# Patient Record
Sex: FEMALE | Race: WHITE | NOT HISPANIC OR LATINO | Employment: UNEMPLOYED | URBAN - METROPOLITAN AREA
[De-identification: names, ages, dates, MRNs, and addresses within clinical notes are randomized per-mention and may not be internally consistent; named-entity substitution may affect disease eponyms.]

---
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
## 2024-01-08 ENCOUNTER — TRANSCRIBE ORDERS (OUTPATIENT)
Dept: LAB | Facility: HOSPITAL | Age: 19
End: 2024-01-08
Payer: COMMERCIAL

## 2024-01-08 ENCOUNTER — LAB (OUTPATIENT)
Dept: LAB | Facility: HOSPITAL | Age: 19
End: 2024-01-08
Payer: COMMERCIAL

## 2024-01-08 DIAGNOSIS — Z3A.10 10 WEEKS GESTATION OF PREGNANCY: ICD-10-CM

## 2024-01-08 DIAGNOSIS — Z34.81 PRENATAL CARE, SUBSEQUENT PREGNANCY, FIRST TRIMESTER: ICD-10-CM

## 2024-01-08 DIAGNOSIS — Z34.81 PRENATAL CARE, SUBSEQUENT PREGNANCY, FIRST TRIMESTER: Primary | ICD-10-CM

## 2024-01-08 LAB
ABO GROUP BLD: NORMAL
BLD GP AB SCN SERPL QL: NEGATIVE
DEPRECATED RDW RBC AUTO: 42.4 FL (ref 37–54)
ERYTHROCYTE [DISTWIDTH] IN BLOOD BY AUTOMATED COUNT: 13.8 % (ref 12.3–15.4)
HBA1C MFR BLD: 4.8 % (ref 4.8–5.6)
HBV SURFACE AG SERPL QL IA: NORMAL
HCT VFR BLD AUTO: 38.9 % (ref 34–46.6)
HGB BLD-MCNC: 13.2 G/DL (ref 12–15.9)
HIV 1+2 AB+HIV1 P24 AG SERPL QL IA: NORMAL
MCH RBC QN AUTO: 28.7 PG (ref 26.6–33)
MCHC RBC AUTO-ENTMCNC: 33.9 G/DL (ref 31.5–35.7)
MCV RBC AUTO: 84.6 FL (ref 79–97)
PLATELET # BLD AUTO: 226 10*3/MM3 (ref 140–450)
PMV BLD AUTO: 11 FL (ref 6–12)
RBC # BLD AUTO: 4.6 10*6/MM3 (ref 3.77–5.28)
RH BLD: POSITIVE
WBC NRBC COR # BLD AUTO: 8.17 10*3/MM3 (ref 3.4–10.8)

## 2024-01-08 PROCEDURE — 87340 HEPATITIS B SURFACE AG IA: CPT

## 2024-01-08 PROCEDURE — 86901 BLOOD TYPING SEROLOGIC RH(D): CPT

## 2024-01-08 PROCEDURE — 86900 BLOOD TYPING SEROLOGIC ABO: CPT

## 2024-01-08 PROCEDURE — 83036 HEMOGLOBIN GLYCOSYLATED A1C: CPT

## 2024-01-08 PROCEDURE — 86850 RBC ANTIBODY SCREEN: CPT

## 2024-01-08 PROCEDURE — 36415 COLL VENOUS BLD VENIPUNCTURE: CPT

## 2024-01-08 PROCEDURE — G0432 EIA HIV-1/HIV-2 SCREEN: HCPCS

## 2024-01-08 PROCEDURE — 87086 URINE CULTURE/COLONY COUNT: CPT

## 2024-01-08 PROCEDURE — 86762 RUBELLA ANTIBODY: CPT

## 2024-01-08 PROCEDURE — 85027 COMPLETE CBC AUTOMATED: CPT

## 2024-01-09 LAB
BACTERIA SPEC AEROBE CULT: NORMAL
RUBV IGG SERPL IA-ACNC: 1.07 INDEX

## 2024-02-08 ENCOUNTER — LAB (OUTPATIENT)
Dept: LAB | Facility: HOSPITAL | Age: 19
End: 2024-02-08
Payer: COMMERCIAL

## 2024-02-08 ENCOUNTER — TRANSCRIBE ORDERS (OUTPATIENT)
Dept: LAB | Facility: HOSPITAL | Age: 19
End: 2024-02-08
Payer: COMMERCIAL

## 2024-02-08 DIAGNOSIS — Z34.82 PRENATAL CARE, SUBSEQUENT PREGNANCY, SECOND TRIMESTER: ICD-10-CM

## 2024-02-08 DIAGNOSIS — Z3A.14 14 WEEKS GESTATION OF PREGNANCY: Primary | ICD-10-CM

## 2024-02-08 LAB
AMPHET+METHAMPHET UR QL: NEGATIVE
AMPHETAMINES UR QL: NEGATIVE
BARBITURATES UR QL SCN: NEGATIVE
BENZODIAZ UR QL SCN: NEGATIVE
BUPRENORPHINE SERPL-MCNC: NEGATIVE NG/ML
CANNABINOIDS SERPL QL: NEGATIVE
COCAINE UR QL: NEGATIVE
FENTANYL UR-MCNC: POSITIVE NG/ML
HCV AB SER DONR QL: NORMAL
METHADONE UR QL SCN: NEGATIVE
OPIATES UR QL: NEGATIVE
OXYCODONE UR QL SCN: NEGATIVE
PCP UR QL SCN: NEGATIVE
TRICYCLICS UR QL SCN: NEGATIVE

## 2024-02-08 PROCEDURE — 80307 DRUG TEST PRSMV CHEM ANLYZR: CPT

## 2024-02-08 PROCEDURE — 36415 COLL VENOUS BLD VENIPUNCTURE: CPT

## 2024-02-08 PROCEDURE — 87591 N.GONORRHOEAE DNA AMP PROB: CPT

## 2024-02-08 PROCEDURE — 86780 TREPONEMA PALLIDUM: CPT

## 2024-02-08 PROCEDURE — 87491 CHLMYD TRACH DNA AMP PROBE: CPT

## 2024-02-08 PROCEDURE — 86803 HEPATITIS C AB TEST: CPT

## 2024-02-09 ENCOUNTER — TRANSCRIBE ORDERS (OUTPATIENT)
Dept: LAB | Facility: HOSPITAL | Age: 19
End: 2024-02-09
Payer: COMMERCIAL

## 2024-02-09 ENCOUNTER — LAB (OUTPATIENT)
Dept: LAB | Facility: HOSPITAL | Age: 19
End: 2024-02-09
Payer: COMMERCIAL

## 2024-02-09 DIAGNOSIS — Z3A.16 16 WEEKS GESTATION OF PREGNANCY: Primary | ICD-10-CM

## 2024-02-09 DIAGNOSIS — Z34.82 PRENATAL CARE, SUBSEQUENT PREGNANCY, SECOND TRIMESTER: ICD-10-CM

## 2024-02-09 DIAGNOSIS — Z3A.16 16 WEEKS GESTATION OF PREGNANCY: ICD-10-CM

## 2024-02-09 LAB
AMPHET+METHAMPHET UR QL: NEGATIVE
AMPHETAMINES UR QL: NEGATIVE
BARBITURATES UR QL SCN: NEGATIVE
BENZODIAZ UR QL SCN: NEGATIVE
BUPRENORPHINE SERPL-MCNC: NEGATIVE NG/ML
CANNABINOIDS SERPL QL: NEGATIVE
COCAINE UR QL: NEGATIVE
FENTANYL UR-MCNC: NEGATIVE NG/ML
METHADONE UR QL SCN: NEGATIVE
OPIATES UR QL: NEGATIVE
OXYCODONE UR QL SCN: NEGATIVE
PCP UR QL SCN: NEGATIVE
TRICYCLICS UR QL SCN: NEGATIVE

## 2024-02-09 PROCEDURE — 80307 DRUG TEST PRSMV CHEM ANLYZR: CPT

## 2024-02-12 LAB — TREPONEMA PALLIDUM IGG+IGM AB [PRESENCE] IN SERUM OR PLASMA BY IMMUNOASSAY: NON REACTIVE

## 2024-02-14 LAB
C TRACH RRNA SPEC QL NAA+PROBE: NEGATIVE
N GONORRHOEA RRNA SPEC QL NAA+PROBE: NEGATIVE

## 2024-04-18 ENCOUNTER — LAB (OUTPATIENT)
Dept: LAB | Facility: HOSPITAL | Age: 19
End: 2024-04-18
Payer: COMMERCIAL

## 2024-04-18 ENCOUNTER — TRANSCRIBE ORDERS (OUTPATIENT)
Dept: LAB | Facility: HOSPITAL | Age: 19
End: 2024-04-18
Payer: COMMERCIAL

## 2024-04-18 DIAGNOSIS — Z3A.25 25 WEEKS GESTATION OF PREGNANCY: ICD-10-CM

## 2024-04-18 DIAGNOSIS — Z34.82 PRENATAL CARE, SUBSEQUENT PREGNANCY, SECOND TRIMESTER: Primary | ICD-10-CM

## 2024-04-18 DIAGNOSIS — Z34.82 PRENATAL CARE, SUBSEQUENT PREGNANCY, SECOND TRIMESTER: ICD-10-CM

## 2024-04-18 PROCEDURE — 80307 DRUG TEST PRSMV CHEM ANLYZR: CPT

## 2024-05-15 ENCOUNTER — LAB (OUTPATIENT)
Dept: LAB | Facility: HOSPITAL | Age: 19
End: 2024-05-15
Payer: COMMERCIAL

## 2024-05-15 DIAGNOSIS — Z3A.25 25 WEEKS GESTATION OF PREGNANCY: ICD-10-CM

## 2024-05-15 DIAGNOSIS — Z34.82 PRENATAL CARE, SUBSEQUENT PREGNANCY, SECOND TRIMESTER: ICD-10-CM

## 2024-05-15 LAB
BLD GP AB SCN SERPL QL: NEGATIVE
DEPRECATED RDW RBC AUTO: 35.6 FL (ref 37–54)
ERYTHROCYTE [DISTWIDTH] IN BLOOD BY AUTOMATED COUNT: 11.5 % (ref 12.3–15.4)
GLUCOSE 1H P 100 G GLC PO SERPL-MCNC: 79 MG/DL (ref 65–139)
HCT VFR BLD AUTO: 39.9 % (ref 34–46.6)
HGB BLD-MCNC: 13.3 G/DL (ref 12–15.9)
MCH RBC QN AUTO: 28.8 PG (ref 26.6–33)
MCHC RBC AUTO-ENTMCNC: 33.3 G/DL (ref 31.5–35.7)
MCV RBC AUTO: 86.4 FL (ref 79–97)
PLATELET # BLD AUTO: 132 10*3/MM3 (ref 140–450)
PMV BLD AUTO: 12.3 FL (ref 6–12)
RBC # BLD AUTO: 4.62 10*6/MM3 (ref 3.77–5.28)
WBC NRBC COR # BLD AUTO: 7.16 10*3/MM3 (ref 3.4–10.8)

## 2024-05-15 PROCEDURE — 86780 TREPONEMA PALLIDUM: CPT

## 2024-05-15 PROCEDURE — 36415 COLL VENOUS BLD VENIPUNCTURE: CPT

## 2024-05-15 PROCEDURE — 82950 GLUCOSE TEST: CPT

## 2024-05-15 PROCEDURE — 86850 RBC ANTIBODY SCREEN: CPT

## 2024-05-15 PROCEDURE — 85027 COMPLETE CBC AUTOMATED: CPT

## 2024-05-16 LAB — TREPONEMA PALLIDUM IGG+IGM AB [PRESENCE] IN SERUM OR PLASMA BY IMMUNOASSAY: NON REACTIVE

## 2024-06-26 ENCOUNTER — LAB (OUTPATIENT)
Dept: LAB | Facility: HOSPITAL | Age: 19
End: 2024-06-26
Payer: COMMERCIAL

## 2024-06-26 ENCOUNTER — TRANSCRIBE ORDERS (OUTPATIENT)
Dept: LAB | Facility: HOSPITAL | Age: 19
End: 2024-06-26
Payer: COMMERCIAL

## 2024-06-26 DIAGNOSIS — Z34.83 PRENATAL CARE, SUBSEQUENT PREGNANCY, THIRD TRIMESTER: Primary | ICD-10-CM

## 2024-06-26 DIAGNOSIS — Z34.83 PRENATAL CARE, SUBSEQUENT PREGNANCY, THIRD TRIMESTER: ICD-10-CM

## 2024-06-26 LAB
BASOPHILS # BLD AUTO: 0.01 10*3/MM3 (ref 0–0.2)
BASOPHILS NFR BLD AUTO: 0.1 % (ref 0–1.5)
DEPRECATED RDW RBC AUTO: 35.8 FL (ref 37–54)
EOSINOPHIL # BLD AUTO: 0.07 10*3/MM3 (ref 0–0.4)
EOSINOPHIL NFR BLD AUTO: 1 % (ref 0.3–6.2)
ERYTHROCYTE [DISTWIDTH] IN BLOOD BY AUTOMATED COUNT: 11.9 % (ref 12.3–15.4)
HCT VFR BLD AUTO: 39.4 % (ref 34–46.6)
HGB BLD-MCNC: 13 G/DL (ref 12–15.9)
IMM GRANULOCYTES # BLD AUTO: 0.05 10*3/MM3 (ref 0–0.05)
IMM GRANULOCYTES NFR BLD AUTO: 0.7 % (ref 0–0.5)
LYMPHOCYTES # BLD AUTO: 1.61 10*3/MM3 (ref 0.7–3.1)
LYMPHOCYTES NFR BLD AUTO: 22.2 % (ref 19.6–45.3)
MCH RBC QN AUTO: 27.6 PG (ref 26.6–33)
MCHC RBC AUTO-ENTMCNC: 33 G/DL (ref 31.5–35.7)
MCV RBC AUTO: 83.7 FL (ref 79–97)
MONOCYTES # BLD AUTO: 0.61 10*3/MM3 (ref 0.1–0.9)
MONOCYTES NFR BLD AUTO: 8.4 % (ref 5–12)
NEUTROPHILS NFR BLD AUTO: 4.9 10*3/MM3 (ref 1.7–7)
NEUTROPHILS NFR BLD AUTO: 67.6 % (ref 42.7–76)
NRBC BLD AUTO-RTO: 0 /100 WBC (ref 0–0.2)
PLATELET # BLD AUTO: 234 10*3/MM3 (ref 140–450)
PMV BLD AUTO: 11 FL (ref 6–12)
RBC # BLD AUTO: 4.71 10*6/MM3 (ref 3.77–5.28)
WBC NRBC COR # BLD AUTO: 7.25 10*3/MM3 (ref 3.4–10.8)

## 2024-06-26 PROCEDURE — 85025 COMPLETE CBC W/AUTO DIFF WBC: CPT

## 2024-06-26 PROCEDURE — 36415 COLL VENOUS BLD VENIPUNCTURE: CPT

## 2024-07-14 ENCOUNTER — HOSPITAL ENCOUNTER (INPATIENT)
Facility: HOSPITAL | Age: 19
LOS: 4 days | Discharge: HOME | End: 2024-07-18
Attending: OBSTETRICS & GYNECOLOGY | Admitting: OBSTETRICS & GYNECOLOGY
Payer: COMMERCIAL

## 2024-07-14 PROBLEM — Z34.03 ENCOUNTER FOR SUPERVISION OF NORMAL FIRST PREGNANCY IN THIRD TRIMESTER: Status: ACTIVE | Noted: 2024-07-14

## 2024-07-14 PROBLEM — O16.3 ELEVATED BLOOD PRESSURE AFFECTING PREGNANCY IN THIRD TRIMESTER, ANTEPARTUM: Status: ACTIVE | Noted: 2024-07-14

## 2024-07-14 PROBLEM — O13.9 PREGNANCY INDUCED HYPERTENSION: Status: ACTIVE | Noted: 2024-07-14

## 2024-07-14 PROBLEM — O14.90 PREECLAMPSIA: Status: ACTIVE | Noted: 2024-07-14

## 2024-07-14 LAB
ABO GROUP BLD: NORMAL
ALBUMIN SERPL-MCNC: 3.3 G/DL (ref 3.5–5.2)
ALBUMIN/GLOB SERPL: 1.1 G/DL
ALP SERPL-CCNC: 138 U/L (ref 39–117)
ALT SERPL W P-5'-P-CCNC: 6 U/L (ref 1–33)
AMPHET+METHAMPHET UR QL: NEGATIVE
AMPHETAMINES UR QL: NEGATIVE
ANION GAP SERPL CALCULATED.3IONS-SCNC: 11 MMOL/L (ref 5–15)
AST SERPL-CCNC: 13 U/L (ref 1–32)
BARBITURATES UR QL SCN: NEGATIVE
BENZODIAZ UR QL SCN: NEGATIVE
BILIRUB SERPL-MCNC: 0.2 MG/DL (ref 0–1.2)
BLD GP AB SCN SERPL QL: NEGATIVE
BUN SERPL-MCNC: 9 MG/DL (ref 6–20)
BUN/CREAT SERPL: 13.8 (ref 7–25)
BUPRENORPHINE SERPL-MCNC: NEGATIVE NG/ML
CALCIUM SPEC-SCNC: 8.7 MG/DL (ref 8.6–10.5)
CANNABINOIDS SERPL QL: NEGATIVE
CHLORIDE SERPL-SCNC: 105 MMOL/L (ref 98–107)
CO2 SERPL-SCNC: 21 MMOL/L (ref 22–29)
COCAINE UR QL: NEGATIVE
CREAT SERPL-MCNC: 0.65 MG/DL (ref 0.57–1)
DEPRECATED RDW RBC AUTO: 37.8 FL (ref 37–54)
EGFRCR SERPLBLD CKD-EPI 2021: 130.3 ML/MIN/1.73
ERYTHROCYTE [DISTWIDTH] IN BLOOD BY AUTOMATED COUNT: 12.7 % (ref 12.3–15.4)
FENTANYL UR-MCNC: NEGATIVE NG/ML
GLOBULIN UR ELPH-MCNC: 2.9 GM/DL
GLUCOSE SERPL-MCNC: 94 MG/DL (ref 65–99)
HCT VFR BLD AUTO: 37.4 % (ref 34–46.6)
HGB BLD-MCNC: 12.4 G/DL (ref 12–15.9)
MCH RBC QN AUTO: 27.3 PG (ref 26.6–33)
MCHC RBC AUTO-ENTMCNC: 33.2 G/DL (ref 31.5–35.7)
MCV RBC AUTO: 82.2 FL (ref 79–97)
METHADONE UR QL SCN: NEGATIVE
OPIATES UR QL: NEGATIVE
OXYCODONE UR QL SCN: NEGATIVE
PCP UR QL SCN: NEGATIVE
PLATELET # BLD AUTO: 229 10*3/MM3 (ref 140–450)
PMV BLD AUTO: 11.8 FL (ref 6–12)
POTASSIUM SERPL-SCNC: 3.9 MMOL/L (ref 3.5–5.2)
PROT SERPL-MCNC: 6.2 G/DL (ref 6–8.5)
RBC # BLD AUTO: 4.55 10*6/MM3 (ref 3.77–5.28)
RH BLD: POSITIVE
SODIUM SERPL-SCNC: 137 MMOL/L (ref 136–145)
T&S EXPIRATION DATE: NORMAL
TRICYCLICS UR QL SCN: NEGATIVE
WBC NRBC COR # BLD AUTO: 9.55 10*3/MM3 (ref 3.4–10.8)

## 2024-07-14 PROCEDURE — 25810000003 LACTATED RINGERS PER 1000 ML: Performed by: ADVANCED PRACTICE MIDWIFE

## 2024-07-14 PROCEDURE — 80053 COMPREHEN METABOLIC PANEL: CPT | Performed by: OBSTETRICS & GYNECOLOGY

## 2024-07-14 PROCEDURE — 86901 BLOOD TYPING SEROLOGIC RH(D): CPT | Performed by: OBSTETRICS & GYNECOLOGY

## 2024-07-14 PROCEDURE — 85027 COMPLETE CBC AUTOMATED: CPT | Performed by: OBSTETRICS & GYNECOLOGY

## 2024-07-14 PROCEDURE — 99221 1ST HOSP IP/OBS SF/LOW 40: CPT | Performed by: OBSTETRICS & GYNECOLOGY

## 2024-07-14 PROCEDURE — 59025 FETAL NON-STRESS TEST: CPT

## 2024-07-14 PROCEDURE — 80307 DRUG TEST PRSMV CHEM ANLYZR: CPT | Performed by: OBSTETRICS & GYNECOLOGY

## 2024-07-14 PROCEDURE — 86850 RBC ANTIBODY SCREEN: CPT | Performed by: OBSTETRICS & GYNECOLOGY

## 2024-07-14 PROCEDURE — 86900 BLOOD TYPING SEROLOGIC ABO: CPT | Performed by: OBSTETRICS & GYNECOLOGY

## 2024-07-14 PROCEDURE — 3E033VJ INTRODUCTION OF OTHER HORMONE INTO PERIPHERAL VEIN, PERCUTANEOUS APPROACH: ICD-10-PCS | Performed by: ADVANCED PRACTICE MIDWIFE

## 2024-07-14 PROCEDURE — 59025 FETAL NON-STRESS TEST: CPT | Performed by: OBSTETRICS & GYNECOLOGY

## 2024-07-14 PROCEDURE — 93005 ELECTROCARDIOGRAM TRACING: CPT | Performed by: ADVANCED PRACTICE MIDWIFE

## 2024-07-14 PROCEDURE — 86780 TREPONEMA PALLIDUM: CPT | Performed by: OBSTETRICS & GYNECOLOGY

## 2024-07-14 PROCEDURE — 93010 ELECTROCARDIOGRAM REPORT: CPT | Performed by: INTERNAL MEDICINE

## 2024-07-14 RX ORDER — ACETAMINOPHEN 325 MG/1
650 TABLET ORAL EVERY 4 HOURS PRN
Status: DISCONTINUED | OUTPATIENT
Start: 2024-07-14 | End: 2024-07-15 | Stop reason: SDUPTHER

## 2024-07-14 RX ORDER — SODIUM CHLORIDE 9 MG/ML
40 INJECTION, SOLUTION INTRAVENOUS AS NEEDED
Status: DISCONTINUED | OUTPATIENT
Start: 2024-07-14 | End: 2024-07-16 | Stop reason: HOSPADM

## 2024-07-14 RX ORDER — SODIUM CHLORIDE 0.9 % (FLUSH) 0.9 %
10 SYRINGE (ML) INJECTION AS NEEDED
Status: DISCONTINUED | OUTPATIENT
Start: 2024-07-14 | End: 2024-07-16 | Stop reason: HOSPADM

## 2024-07-14 RX ORDER — ONDANSETRON 2 MG/ML
4 INJECTION INTRAMUSCULAR; INTRAVENOUS EVERY 6 HOURS PRN
Status: DISCONTINUED | OUTPATIENT
Start: 2024-07-14 | End: 2024-07-16 | Stop reason: SDUPTHER

## 2024-07-14 RX ORDER — SODIUM CHLORIDE, SODIUM LACTATE, POTASSIUM CHLORIDE, CALCIUM CHLORIDE 600; 310; 30; 20 MG/100ML; MG/100ML; MG/100ML; MG/100ML
125 INJECTION, SOLUTION INTRAVENOUS CONTINUOUS
Status: DISCONTINUED | OUTPATIENT
Start: 2024-07-14 | End: 2024-07-16

## 2024-07-14 RX ORDER — MORPHINE SULFATE 2 MG/ML
2 INJECTION, SOLUTION INTRAMUSCULAR; INTRAVENOUS
Status: DISCONTINUED | OUTPATIENT
Start: 2024-07-14 | End: 2024-07-16 | Stop reason: HOSPADM

## 2024-07-14 RX ORDER — MAGNESIUM CARB/ALUMINUM HYDROX 105-160MG
30 TABLET,CHEWABLE ORAL ONCE
Status: DISCONTINUED | OUTPATIENT
Start: 2024-07-14 | End: 2024-07-16 | Stop reason: HOSPADM

## 2024-07-14 RX ORDER — NALOXONE HCL 0.4 MG/ML
0.4 VIAL (ML) INJECTION
Status: DISCONTINUED | OUTPATIENT
Start: 2024-07-14 | End: 2024-07-16 | Stop reason: HOSPADM

## 2024-07-14 RX ORDER — SODIUM CHLORIDE 0.9 % (FLUSH) 0.9 %
10 SYRINGE (ML) INJECTION EVERY 12 HOURS SCHEDULED
Status: DISCONTINUED | OUTPATIENT
Start: 2024-07-14 | End: 2024-07-16 | Stop reason: HOSPADM

## 2024-07-14 RX ORDER — CALCIUM CARBONATE 500 MG/1
1 TABLET, CHEWABLE ORAL DAILY
COMMUNITY

## 2024-07-14 RX ORDER — LIDOCAINE HYDROCHLORIDE 10 MG/ML
0.5 INJECTION, SOLUTION EPIDURAL; INFILTRATION; INTRACAUDAL; PERINEURAL ONCE AS NEEDED
Status: DISCONTINUED | OUTPATIENT
Start: 2024-07-14 | End: 2024-07-16 | Stop reason: HOSPADM

## 2024-07-14 RX ORDER — TERBUTALINE SULFATE 1 MG/ML
0.25 INJECTION, SOLUTION SUBCUTANEOUS AS NEEDED
Status: DISCONTINUED | OUTPATIENT
Start: 2024-07-14 | End: 2024-07-16 | Stop reason: HOSPADM

## 2024-07-14 RX ORDER — ONDANSETRON 4 MG/1
4 TABLET, ORALLY DISINTEGRATING ORAL EVERY 6 HOURS PRN
Status: DISCONTINUED | OUTPATIENT
Start: 2024-07-14 | End: 2024-07-16 | Stop reason: SDUPTHER

## 2024-07-14 RX ORDER — MORPHINE SULFATE 2 MG/ML
2 INJECTION, SOLUTION INTRAMUSCULAR; INTRAVENOUS EVERY 4 HOURS PRN
Status: DISCONTINUED | OUTPATIENT
Start: 2024-07-14 | End: 2024-07-16 | Stop reason: HOSPADM

## 2024-07-14 RX ADMIN — SODIUM CHLORIDE, POTASSIUM CHLORIDE, SODIUM LACTATE AND CALCIUM CHLORIDE 125 ML/HR: 600; 310; 30; 20 INJECTION, SOLUTION INTRAVENOUS at 22:01

## 2024-07-14 NOTE — H&P
"[facility]  Ct Doe  : 2005  MRN: [id number]  CSN: [id number]    History and Physical    Subjective   Chief Complaint   Patient presents with    Decreased Fetal Movement     Ct Doe is a 18 y.o. year old  with an Estimated Date of Delivery: 8/3/24 currently at 37w1d presenting with decreased fetal movement for less than 24 hours.  Denies leaking of fluid or bleeding.  Denies contractions.    [facility]atal care [facility].  It has been benign.    OB History    Para Term  AB Living   1 0 0 0 0 0   SAB IAB Ectopic Molar Multiple Live Births   0 0 0 0 0 0      # Outcome Date GA Lbr Ganga/2nd Weight Sex Type Anes PTL Lv   1 Current                No past medical history on file.    No past surgical history on file.    Medications Prior to Admission   Medication Sig Dispense Refill Last Dose    calcium carbonate (TUMS) 500 MG chewable tablet Chew 1 tablet Daily.   Past Week       No Known Allergies  Social History    Tobacco Use      Smoking status: Never      Smokeless tobacco: Not on file    Review of Systems   Constitutional:  Negative for unexpected weight change.   Respiratory:  Negative for cough and shortness of breath.    Cardiovascular:  Negative for chest pain.   Gastrointestinal:  Negative for abdominal distention, constipation and diarrhea.        No persistent bloating   Genitourinary:  Negative for difficulty urinating, dysuria, hematuria and urgency.        No significant incontinence   Skin:  Negative for rash.   Neurological:  Negative for headaches.         Objective   /95   Pulse 86   Resp 16   Ht 162.6 cm (64\")   SpO2 96%   General: well developed; well nourished  no acute distress   Abdomen: soft, non-tender; no masses  no umbilical or inguinal hernias are present  no hepato-splenomegaly   FHT's: reassuring and category 1      Cervix: was not checked.   Presentation: cephalic   Contractions: none     Prenatal Labs  Lab " Results   Component Value Date    HGB 13.0 2024    RUBELLAABIGG 1.07 2024    HEPBSAG Non-Reactive 2024    TREPONEMAP Non Reactive 05/15/2024    ABSCRN Negative 05/15/2024    FHI7LXY3 Non-Reactive 2024    HEPCVIRUSABY Non-Reactive 2024    GCT 79 05/15/2024    URINECX <10,000 CFU/mL Normal Urogenital Victorina 2024    CHLAMNAA Negative 2024    NGONORRHON Negative 2024        Assessment   IUP at 37w1d  Decreased fetal movement with reactive, category 1 fetal heart rate tracing  Elevated systolic and diastolic blood pressure x 1     Plan   Observe for the next hour to check blood pressure  If blood pressures come back down to normal okay for discharge to home with short interval follow-up to recheck blood pressure in clinic this week  If blood pressures remain elevated, discussed with her primary OB on-call but anticipate given gestational age will admit for further evaluation with anticipated induction of labor    Saurav Cordero MD  2024  18:32 EDT          Non Stress Test    [facility]    Patient: Ct Doe  : 2005  MRN: [id number]  CSN: [id number]  Date: 2024    Estimated Date of Delivery: 8/3/24  Gestational Age: 37w1d    Indication for NST decreased fetal movement       Total Time on NST > 20 minutes       Interpretation    Baseline  beats per minute   Category 1   Decelerations Absent       Additional Comments none       This note has been electronically signed.    Saurav Cordero MD  2024  18:32 EDT

## 2024-07-14 NOTE — PROGRESS NOTES
Patient Vitals for the past 24 hrs:   BP Temp Pulse SpO2   07/14/24 1910 120/78 -- -- --   07/14/24 1900 126/98 -- -- --   07/14/24 1850 124/90 -- -- --   07/14/24 1840 126/89 -- -- --   07/14/24 1831 128/94 -- -- --   07/14/24 1828 -- 98.1 °F (36.7 °C) 86 97 %   07/14/24 1827 -- -- 86 96 %   07/14/24 1826 -- -- 80 97 %   07/14/24 1824 140/95 -- -- --     Will bring to labor and delivery and observe blood pressures  Allow to eat at this point and check NSTs  Does not need continuous monitoring  Currently does not meet criteria for gestational hypertensive disease may develop that with serial blood pressures  Check CBC and CMP  May be candidate for discharge if blood pressures continue to trend down  Have reviewed with RAJI Farias M.D.  July 14, 2024  19:21 EDT

## 2024-07-15 ENCOUNTER — ANESTHESIA EVENT (OUTPATIENT)
Dept: OBSTETRICS AND GYNECOLOGY | Facility: HOSPITAL | Age: 19
End: 2024-07-15
Payer: COMMERCIAL

## 2024-07-15 ENCOUNTER — APPOINTMENT (OUTPATIENT)
Dept: CARDIOLOGY | Facility: HOSPITAL | Age: 19
End: 2024-07-15
Payer: COMMERCIAL

## 2024-07-15 ENCOUNTER — ANESTHESIA (OUTPATIENT)
Dept: LABOR AND DELIVERY | Facility: HOSPITAL | Age: 19
End: 2024-07-15
Payer: COMMERCIAL

## 2024-07-15 ENCOUNTER — ANESTHESIA EVENT (OUTPATIENT)
Dept: LABOR AND DELIVERY | Facility: HOSPITAL | Age: 19
End: 2024-07-15
Payer: COMMERCIAL

## 2024-07-15 ENCOUNTER — ANESTHESIA (OUTPATIENT)
Dept: OBSTETRICS AND GYNECOLOGY | Facility: HOSPITAL | Age: 19
End: 2024-07-15
Payer: COMMERCIAL

## 2024-07-15 ENCOUNTER — APPOINTMENT (OUTPATIENT)
Dept: GENERAL RADIOLOGY | Facility: HOSPITAL | Age: 19
End: 2024-07-15
Payer: COMMERCIAL

## 2024-07-15 PROBLEM — Z34.03 ENCOUNTER FOR SUPERVISION OF NORMAL FIRST PREGNANCY IN THIRD TRIMESTER: Status: RESOLVED | Noted: 2024-07-14 | Resolved: 2024-07-15

## 2024-07-15 LAB
BH CV ECHO MEAS - AO MAX PG: 8.3 MMHG
BH CV ECHO MEAS - AO MEAN PG: 4.5 MMHG
BH CV ECHO MEAS - AO ROOT DIAM: 3.4 CM
BH CV ECHO MEAS - AO V2 MAX: 144 CM/SEC
BH CV ECHO MEAS - AO V2 VTI: 29.9 CM
BH CV ECHO MEAS - AVA(I,D): 2.19 CM2
BH CV ECHO MEAS - EDV(CUBED): 64 ML
BH CV ECHO MEAS - EDV(MOD-SP2): 87.4 ML
BH CV ECHO MEAS - EDV(MOD-SP4): 117 ML
BH CV ECHO MEAS - EF(MOD-BP): 58.8 %
BH CV ECHO MEAS - EF(MOD-SP2): 60.3 %
BH CV ECHO MEAS - EF(MOD-SP4): 59.9 %
BH CV ECHO MEAS - ESV(CUBED): 15.6 ML
BH CV ECHO MEAS - ESV(MOD-SP2): 34.7 ML
BH CV ECHO MEAS - ESV(MOD-SP4): 46.9 ML
BH CV ECHO MEAS - FS: 37.5 %
BH CV ECHO MEAS - IVS/LVPW: 1.11 CM
BH CV ECHO MEAS - IVSD: 1 CM
BH CV ECHO MEAS - LA DIMENSION: 2.9 CM
BH CV ECHO MEAS - LAT PEAK E' VEL: 13.9 CM/SEC
BH CV ECHO MEAS - LV MASS(C)D: 118.2 GRAMS
BH CV ECHO MEAS - LV MAX PG: 4.4 MMHG
BH CV ECHO MEAS - LV MEAN PG: 2.5 MMHG
BH CV ECHO MEAS - LV V1 MAX: 104.5 CM/SEC
BH CV ECHO MEAS - LV V1 VTI: 20.8 CM
BH CV ECHO MEAS - LVIDD: 4 CM
BH CV ECHO MEAS - LVIDS: 2.5 CM
BH CV ECHO MEAS - LVOT AREA: 3.1 CM2
BH CV ECHO MEAS - LVOT DIAM: 2 CM
BH CV ECHO MEAS - LVPWD: 0.9 CM
BH CV ECHO MEAS - MED PEAK E' VEL: 6.6 CM/SEC
BH CV ECHO MEAS - MV A MAX VEL: 55.5 CM/SEC
BH CV ECHO MEAS - MV DEC SLOPE: 362 CM/SEC2
BH CV ECHO MEAS - MV DEC TIME: 0.21 SEC
BH CV ECHO MEAS - MV E MAX VEL: 69.7 CM/SEC
BH CV ECHO MEAS - MV E/A: 1.26
BH CV ECHO MEAS - MV MAX PG: 2.6 MMHG
BH CV ECHO MEAS - MV MEAN PG: 1 MMHG
BH CV ECHO MEAS - MV P1/2T: 64.6 MSEC
BH CV ECHO MEAS - MV V2 VTI: 24.3 CM
BH CV ECHO MEAS - MVA(P1/2T): 3.4 CM2
BH CV ECHO MEAS - MVA(VTI): 2.7 CM2
BH CV ECHO MEAS - PA ACC TIME: 0.16 SEC
BH CV ECHO MEAS - PA V2 MAX: 112 CM/SEC
BH CV ECHO MEAS - RAP SYSTOLE: 3 MMHG
BH CV ECHO MEAS - RVSP: 18 MMHG
BH CV ECHO MEAS - SV(LVOT): 65.3 ML
BH CV ECHO MEAS - SV(MOD-SP2): 52.7 ML
BH CV ECHO MEAS - SV(MOD-SP4): 70.1 ML
BH CV ECHO MEAS - TAPSE (>1.6): 1.6 CM
BH CV ECHO MEAS - TR MAX PG: 15 MMHG
BH CV ECHO MEAS - TR MAX VEL: 190.5 CM/SEC
BH CV ECHO MEASUREMENTS AVERAGE E/E' RATIO: 6.8
BH CV XLRA - RV BASE: 3.5 CM
BH CV XLRA - RV LENGTH: 9.4 CM
BH CV XLRA - RV MID: 3.2 CM
BH CV XLRA - TDI S': 12.5 CM/SEC
EXTERNAL GROUP B STREP ANTIGEN: NORMAL
LEFT ATRIUM VOLUME INDEX: 17.3 ML/M2
TREPONEMA PALLIDUM IGG+IGM AB [PRESENCE] IN SERUM OR PLASMA BY IMMUNOASSAY: NORMAL

## 2024-07-15 PROCEDURE — 25010000002 MORPHINE PER 10 MG: Performed by: ADVANCED PRACTICE MIDWIFE

## 2024-07-15 PROCEDURE — 25010000002 PROPOFOL 10 MG/ML EMULSION: Performed by: ANESTHESIOLOGY

## 2024-07-15 PROCEDURE — 25010000002 METOCLOPRAMIDE PER 10 MG: Performed by: ANESTHESIOLOGY

## 2024-07-15 PROCEDURE — 25810000003 LACTATED RINGERS PER 1000 ML: Performed by: ANESTHESIOLOGY

## 2024-07-15 PROCEDURE — 93306 TTE W/DOPPLER COMPLETE: CPT | Performed by: INTERNAL MEDICINE

## 2024-07-15 PROCEDURE — 25810000003 LACTATED RINGERS PER 1000 ML: Performed by: ADVANCED PRACTICE MIDWIFE

## 2024-07-15 PROCEDURE — 71045 X-RAY EXAM CHEST 1 VIEW: CPT

## 2024-07-15 PROCEDURE — 99222 1ST HOSP IP/OBS MODERATE 55: CPT | Performed by: INTERNAL MEDICINE

## 2024-07-15 PROCEDURE — 93306 TTE W/DOPPLER COMPLETE: CPT

## 2024-07-15 PROCEDURE — 10907ZC DRAINAGE OF AMNIOTIC FLUID, THERAPEUTIC FROM PRODUCTS OF CONCEPTION, VIA NATURAL OR ARTIFICIAL OPENING: ICD-10-PCS

## 2024-07-15 PROCEDURE — 25010000002 CEFAZOLIN PER 500 MG: Performed by: ADVANCED PRACTICE MIDWIFE

## 2024-07-15 PROCEDURE — 25010000002 OXYTOCIN PER 10 UNITS: Performed by: ANESTHESIOLOGY

## 2024-07-15 PROCEDURE — 25010000002 HYDROMORPHONE PER 4 MG: Performed by: ANESTHESIOLOGY

## 2024-07-15 RX ORDER — OXYTOCIN/0.9 % SODIUM CHLORIDE 30/500 ML
PLASTIC BAG, INJECTION (ML) INTRAVENOUS
Status: DISCONTINUED
Start: 2024-07-15 | End: 2024-07-18 | Stop reason: HOSPADM

## 2024-07-15 RX ORDER — FAMOTIDINE 10 MG/ML
INJECTION, SOLUTION INTRAVENOUS AS NEEDED
Status: DISCONTINUED | OUTPATIENT
Start: 2024-07-15 | End: 2024-07-16 | Stop reason: SURG

## 2024-07-15 RX ORDER — EPHEDRINE SULFATE 5 MG/ML
INJECTION INTRAVENOUS
Status: DISCONTINUED
Start: 2024-07-15 | End: 2024-07-15 | Stop reason: WASHOUT

## 2024-07-15 RX ORDER — SODIUM CHLORIDE, SODIUM LACTATE, POTASSIUM CHLORIDE, CALCIUM CHLORIDE 600; 310; 30; 20 MG/100ML; MG/100ML; MG/100ML; MG/100ML
INJECTION, SOLUTION INTRAVENOUS CONTINUOUS PRN
Status: DISCONTINUED | OUTPATIENT
Start: 2024-07-15 | End: 2024-07-16 | Stop reason: SURG

## 2024-07-15 RX ORDER — PROPOFOL 10 MG/ML
VIAL (ML) INTRAVENOUS AS NEEDED
Status: DISCONTINUED | OUTPATIENT
Start: 2024-07-15 | End: 2024-07-16 | Stop reason: SURG

## 2024-07-15 RX ORDER — OXYTOCIN/0.9 % SODIUM CHLORIDE 30/500 ML
PLASTIC BAG, INJECTION (ML) INTRAVENOUS AS NEEDED
Status: DISCONTINUED | OUTPATIENT
Start: 2024-07-15 | End: 2024-07-16 | Stop reason: SURG

## 2024-07-15 RX ORDER — ACETAMINOPHEN 500 MG
1000 TABLET ORAL ONCE
Status: COMPLETED | OUTPATIENT
Start: 2024-07-15 | End: 2024-07-15

## 2024-07-15 RX ORDER — CITRIC ACID/SODIUM CITRATE 334-500MG
30 SOLUTION, ORAL ORAL ONCE
Status: COMPLETED | OUTPATIENT
Start: 2024-07-15 | End: 2024-07-15

## 2024-07-15 RX ORDER — SUCCINYLCHOLINE/SOD CL,ISO/PF 200MG/10ML
SYRINGE (ML) INTRAVENOUS AS NEEDED
Status: DISCONTINUED | OUTPATIENT
Start: 2024-07-15 | End: 2024-07-16 | Stop reason: SURG

## 2024-07-15 RX ORDER — OXYTOCIN 10 [USP'U]/ML
INJECTION, SOLUTION INTRAMUSCULAR; INTRAVENOUS AS NEEDED
Status: DISCONTINUED | OUTPATIENT
Start: 2024-07-15 | End: 2024-07-16 | Stop reason: SURG

## 2024-07-15 RX ORDER — HYDROMORPHONE HYDROCHLORIDE 2 MG/ML
INJECTION, SOLUTION INTRAMUSCULAR; INTRAVENOUS; SUBCUTANEOUS AS NEEDED
Status: DISCONTINUED | OUTPATIENT
Start: 2024-07-15 | End: 2024-07-16 | Stop reason: SURG

## 2024-07-15 RX ORDER — CITRIC ACID/SODIUM CITRATE 334-500MG
SOLUTION, ORAL ORAL
Status: COMPLETED
Start: 2024-07-15 | End: 2024-07-15

## 2024-07-15 RX ORDER — METOCLOPRAMIDE HYDROCHLORIDE 5 MG/ML
INJECTION INTRAMUSCULAR; INTRAVENOUS AS NEEDED
Status: DISCONTINUED | OUTPATIENT
Start: 2024-07-15 | End: 2024-07-16 | Stop reason: SURG

## 2024-07-15 RX ORDER — OXYTOCIN/0.9 % SODIUM CHLORIDE 30/500 ML
2-20 PLASTIC BAG, INJECTION (ML) INTRAVENOUS
Status: DISCONTINUED | OUTPATIENT
Start: 2024-07-15 | End: 2024-07-15

## 2024-07-15 RX ADMIN — SODIUM CHLORIDE, POTASSIUM CHLORIDE, SODIUM LACTATE AND CALCIUM CHLORIDE 125 ML/HR: 600; 310; 30; 20 INJECTION, SOLUTION INTRAVENOUS at 19:36

## 2024-07-15 RX ADMIN — METOCLOPRAMIDE 10 MG: 5 INJECTION, SOLUTION INTRAMUSCULAR; INTRAVENOUS at 22:59

## 2024-07-15 RX ADMIN — SODIUM CHLORIDE, POTASSIUM CHLORIDE, SODIUM LACTATE AND CALCIUM CHLORIDE 125 ML/HR: 600; 310; 30; 20 INJECTION, SOLUTION INTRAVENOUS at 04:13

## 2024-07-15 RX ADMIN — MORPHINE SULFATE 2 MG: 2 INJECTION, SOLUTION INTRAMUSCULAR; INTRAVENOUS at 00:54

## 2024-07-15 RX ADMIN — FAMOTIDINE 20 MG: 10 INJECTION, SOLUTION INTRAVENOUS at 22:59

## 2024-07-15 RX ADMIN — SODIUM CHLORIDE, POTASSIUM CHLORIDE, SODIUM LACTATE AND CALCIUM CHLORIDE 125 ML/HR: 600; 310; 30; 20 INJECTION, SOLUTION INTRAVENOUS at 12:07

## 2024-07-15 RX ADMIN — OXYTOCIN 10 UNITS: 10 INJECTION, SOLUTION INTRAMUSCULAR; INTRAVENOUS at 23:23

## 2024-07-15 RX ADMIN — SODIUM CHLORIDE 2 G: 900 INJECTION INTRAVENOUS at 22:46

## 2024-07-15 RX ADMIN — SODIUM CHLORIDE, POTASSIUM CHLORIDE, SODIUM LACTATE AND CALCIUM CHLORIDE 125 ML/HR: 600; 310; 30; 20 INJECTION, SOLUTION INTRAVENOUS at 21:27

## 2024-07-15 RX ADMIN — Medication 30 ML: at 22:45

## 2024-07-15 RX ADMIN — Medication 2 MILLI-UNITS/MIN: at 12:03

## 2024-07-15 RX ADMIN — HYDROMORPHONE HYDROCHLORIDE 0.5 MG: 2 INJECTION, SOLUTION INTRAMUSCULAR; INTRAVENOUS; SUBCUTANEOUS at 23:42

## 2024-07-15 RX ADMIN — ACETAMINOPHEN 1000 MG: 500 TABLET ORAL at 22:46

## 2024-07-15 RX ADMIN — Medication 500 ML: at 23:25

## 2024-07-15 RX ADMIN — SODIUM CHLORIDE, POTASSIUM CHLORIDE, SODIUM LACTATE AND CALCIUM CHLORIDE: 600; 310; 30; 20 INJECTION, SOLUTION INTRAVENOUS at 22:58

## 2024-07-15 RX ADMIN — PROPOFOL 150 MG: 10 INJECTION, EMULSION INTRAVENOUS at 23:18

## 2024-07-15 RX ADMIN — SODIUM CITRATE AND CITRIC ACID MONOHYDRATE 30 ML: 500; 334 SOLUTION ORAL at 22:45

## 2024-07-15 RX ADMIN — Medication 120 MG: at 23:18

## 2024-07-15 RX ADMIN — HYDROMORPHONE HYDROCHLORIDE 1 MG: 2 INJECTION, SOLUTION INTRAMUSCULAR; INTRAVENOUS; SUBCUTANEOUS at 23:24

## 2024-07-15 RX ADMIN — HYDROMORPHONE HYDROCHLORIDE 0.5 MG: 2 INJECTION, SOLUTION INTRAMUSCULAR; INTRAVENOUS; SUBCUTANEOUS at 23:57

## 2024-07-15 RX ADMIN — MORPHINE SULFATE 2 MG: 2 INJECTION, SOLUTION INTRAMUSCULAR; INTRAVENOUS at 14:45

## 2024-07-15 NOTE — PROGRESS NOTES
"07/14/24  23:04 EDT  Ct Doe      ASSESSMENTS:  PB recheck was 141/90 so will proceed with IOL.  Patient advised and is agreement with plan of care.  Billings bulb inserted without difficulty.      /90   Pulse 89   Temp 98.1 °F (36.7 °C) (Oral)   Resp 18   Ht 162.6 cm (64\")   SpO2 97%   Breastfeeding No     Fetal Heart Rate Assessment   Method: Fetal HR Assessment Method: external   Beats/min: Fetal HR (beats/min): 135   Baseline: Fetal HR Baseline: normal range   Varibility: Fetal HR Variability: moderate (amplitude range 6 to 25 bpm)   Accels: Fetal HR Accelerations: greater than/equal to 15 bpm, lasting at least 15 seconds   Decels: Fetal HR Decelerations: absent   Tracing Category:       Uterine Assessment   Method: Method: external tocotransducer   Frequency (min):     Ctx Count in 10 min:     Duration:     Intensity: Contraction Intensity: no contractions   Intensity by IUPC:     Resting Tone: Uterine Resting Tone: soft by palpation   Resting Tone by IUPC:     Stanley Units:                                Presentation: vertex   Cervix: Exam by: Method: sterile exam per CN   Dilation: Cervical Dilation (cm): 1   Effacement:  70   Station:  -2            Lab Results   Component Value Date    WBC 9.55 07/14/2024    HGB 12.4 07/14/2024    HCT 37.4 07/14/2024    MCV 82.2 07/14/2024     07/14/2024    CREATININE 0.65 07/14/2024    AST 13 07/14/2024    ALT 6 07/14/2024     Results from last 7 days   Lab Units 07/14/24 1934   ABO TYPING  O   RH TYPING  Positive   ANTIBODY SCREEN  Negative       PLAN: Keep for IOL due to pregnancy induced hypertension. Billings balloon inserted, will do low dose pitocin and increase in am    Martina Etsrella CNM  23:04 EDT  07/14/24  "

## 2024-07-15 NOTE — CONSULTS
[facility] Cardiology  Consultation History and Physical  Ct Doe  2005      PCP:   Rima Bass MD (Inactive)        Date of  Consultation: 7/15/2024 10:08 EDT     Reason for Consultation: Cardiac risk evaluation prior to labor induction    History of Present Illness:  Ct Doe  Is a 18 y.o. female with medical history including an admission at [facility] for an overdose in July 2023.  During that stay she did undergo a left heart catheterization for concern for STEMI.  Reviewing the cardiology notes, her coronary arteries showed no significant stenoses.  There was vasospasm noted in the brachial artery during the catheterization and suspected vasospasm of the posterior descending artery.  Patient's left ventricular end-diastolic pressure was elevated at that time at 26.  She states after discharge she did not have any recurrent issues and she did not have any cardiology follow-up.  She did have an echocardiogram done in 2021 prior to possible scoliosis surgery.  Echocardiogram at that time showed a normal ejection fraction.  Normal RV size and function.  Trace tricuspid valve regurgitation.  No other acute issues.  An echocardiogram done earlier this morning again showed a normal ejection fraction of 56 to 60%.  There was mild mitral valve prolapse with only trace mitral valve regurgitation.  Patient states she has not had any issues during this pregnancy.  She initially presented for decreased fetal movement.  She did require nasal cannula oxygen overnight.  She denies chest pain swelling or excess edema.  EKG done last night was normal.      Patient Active Problem List    Diagnosis Date Noted    *Elevated blood pressure affecting pregnancy in third trimester, antepartum 07/14/2024    Encounter for supervision of normal first pregnancy in third trimester 07/14/2024    Pregnancy induced hypertension 07/14/2024       No Known Allergies    Social History  "    Socioeconomic History    Marital status: Single   Tobacco Use    Smoking status: Never     Passive exposure: Never    Smokeless tobacco: Never   Vaping Use    Vaping status: Never Used   Substance and Sexual Activity    Alcohol use: Not Currently    Drug use: Not Currently     Types: Fentanyl, Benzodiazepines     Comment: pt admitted to overdose \"incident\" that resulted in cardiac cath in 2023, pt first stated it was from \"half a xanax I think\" then later stated \"she thinks it was a little bit of fentanyl\"       History reviewed. No pertinent family history.    Current Medications:    Current Facility-Administered Medications:     acetaminophen (TYLENOL) tablet 650 mg, 650 mg, Oral, Q4H PRN, Martina Estrella, CNM    lactated ringers bolus 1,000 mL, 1,000 mL, Intravenous, Once, Martina Estrella, CNM    lactated ringers infusion, 125 mL/hr, Intravenous, Continuous, Martina Estrella, CHRISTOPHERM, Last Rate: 125 mL/hr at 07/15/24 0413, 125 mL/hr at 07/15/24 0413    lidocaine PF 1% (XYLOCAINE) injection 0.5 mL, 0.5 mL, Intradermal, Once PRN, Martina Estrella, CNTRACE    mineral oil liquid 30 mL, 30 mL, Topical, Once, Martina Estrella, RAJI    morphine injection 2 mg, 2 mg, Intravenous, Q4H PRN **AND** naloxone (NARCAN) injection 0.4 mg, 0.4 mg, Intravenous, Q5 Min PRN, Martina Estrella, CHRISTOPHERM    morphine injection 2 mg, 2 mg, Intravenous, Q2H PRN, 2 mg at 07/15/24 0054 **AND** naloxone (NARCAN) injection 0.4 mg, 0.4 mg, Intravenous, Q5 Min PRN, Martina Estrella, CNM    ondansetron ODT (ZOFRAN-ODT) disintegrating tablet 4 mg, 4 mg, Oral, Q6H PRN **OR** ondansetron (ZOFRAN) injection 4 mg, 4 mg, Intravenous, Q6H PRN, Martina Estrella, CHRISTOPHERM    sodium chloride 0.9 % flush 10 mL, 10 mL, Intravenous, Q12H, Martina Estrella, RAJI    sodium chloride 0.9 % flush 10 mL, 10 mL, Intravenous, PRN, Martina Estrella, RAJI    sodium chloride 0.9 % infusion 40 mL, 40 mL, Intravenous, PRN, Martina Estrella, RAJI    terbutaline (BRETHINE) injection 0.25 " mg, 0.25 mg, Subcutaneous, PRN, Martina Estrella CNM     Review of Systems   Cardiovascular: Negative.    Respiratory: Negative.         OBJECTIVE:  Vitals:    07/15/24 1000 07/15/24 1002 07/15/24 1004 07/15/24 1005   BP:       BP Location:       Patient Position:       Pulse: 79 76 78 75   Resp:       Temp:       TempSrc:       SpO2: 97% 99% 97% 97%   Weight:       Height:         No intake/output data recorded.  No intake/output data recorded.  Intake & Output (last 3 days)       None               Physical Exam  Constitutional:       Appearance: Normal appearance.   Cardiovascular:      Rate and Rhythm: Normal rate and regular rhythm.      Pulses: Normal pulses.      Heart sounds: Normal heart sounds. No murmur heard.  Pulmonary:      Effort: Pulmonary effort is normal.      Breath sounds: Normal breath sounds. No rales.   Musculoskeletal:      Right lower leg: No edema.      Left lower leg: No edema.   Neurological:      General: No focal deficit present.      Mental Status: She is alert.         Diagnostic Data:  Lab Results (last 24 hours)       Procedure Component Value Units Date/Time    Group B Streptococcus Culture - Swab, Vaginal/Rectum [[id number]] Resulted: 07/15/24    Specimen: Swab from Vaginal/Rectum Updated: 07/15/24 0037     External Strep Group B Ag NEG     Comment: per Sameer ALEGRIA       Fentanyl, Urine - Urine, Clean Catch [[id number]]  (Normal) Collected: 07/14/24 2200    Specimen: Urine, Clean Catch Updated: 07/14/24 2315     Fentanyl, Urine Negative    Narrative:      Negative Threshold:      Fentanyl 5 ng/mL     The normal value for the drug tested is negative. This report includes final unconfirmed screening results to be used for medical treatment purposes only. Unconfirmed results must not be used for non-medical purposes such as employment or legal testing. Clinical consideration should be applied to any drug of abuse test, particularly when unconfirmed results are used.           Urine  Drug Screen - Urine, Clean Catch [[id number]]  (Normal) Collected: 07/14/24 2200    Specimen: Urine, Clean Catch Updated: 07/14/24 2307     THC, Screen, Urine Negative     Phencyclidine (PCP), Urine Negative     Cocaine Screen, Urine Negative     Methamphetamine, Ur Negative     Opiate Screen Negative     Amphetamine Screen, Urine Negative     Benzodiazepine Screen, Urine Negative     Tricyclic Antidepressants Screen Negative     Methadone Screen, Urine Negative     Barbiturates Screen, Urine Negative     Oxycodone Screen, Urine Negative     Buprenorphine, Screen, Urine Negative    Narrative:      Cutoff For Drugs Screened:    Amphetamines               500 ng/ml  Barbiturates               200 ng/ml  Benzodiazepines            150 ng/ml  Cocaine                    150 ng/ml  Methadone                  200 ng/ml  Opiates                    100 ng/ml  Phencyclidine               25 ng/ml  THC                         50 ng/ml  Methamphetamine            500 ng/ml  Tricyclic Antidepressants  300 ng/ml  Oxycodone                  100 ng/ml  Buprenorphine               10 ng/ml    The normal value for all drugs tested is negative. This report includes unconfirmed screening results, with the cutoff values listed, to be used for medical treatment purposes only.  Unconfirmed results must not be used for non-medical purposes such as employment or legal testing.  Clinical consideration should be applied to any drug of abuse test, particularly when unconfirmed results are used.      Comprehensive Metabolic Panel [[id number]]  (Abnormal) Collected: 07/14/24 1934    Specimen: Blood Updated: 07/14/24 1959     Glucose 94 mg/dL      BUN 9 mg/dL      Creatinine 0.65 mg/dL      Sodium 137 mmol/L      Potassium 3.9 mmol/L      Chloride 105 mmol/L      CO2 21.0 mmol/L      Calcium 8.7 mg/dL      Total Protein 6.2 g/dL      Albumin 3.3 g/dL      ALT (SGPT) 6 U/L      AST (SGOT) 13 U/L      Alkaline Phosphatase 138 U/L      Total Bilirubin  0.2 mg/dL      Globulin 2.9 gm/dL      Comment: Calculated Result        A/G Ratio 1.1 g/dL      BUN/Creatinine Ratio 13.8     Anion Gap 11.0 mmol/L      eGFR 130.3 mL/min/1.73     Narrative:      GFR Normal >60  Chronic Kidney Disease <60  Kidney Failure <15      CBC (No Diff) [[id number]]  (Normal) Collected: 07/14/24 1934    Specimen: Blood Updated: 07/14/24 1942     WBC 9.55 10*3/mm3      RBC 4.55 10*6/mm3      Hemoglobin 12.4 g/dL      Hematocrit 37.4 %      MCV 82.2 fL      MCH 27.3 pg      MCHC 33.2 g/dL      RDW 12.7 %      RDW-SD 37.8 fl      MPV 11.8 fL      Platelets 229 10*3/mm3     Treponema pallidum AB w/Reflex RPR [[id number]] Collected: 07/14/24 1934    Specimen: Blood Updated: 07/14/24 1939          ECG/EMG Results (last 24 hours)       Procedure Component Value Units Date/Time    ECG 12 Lead Other; Hx of STEMI [[id number]] Collected: 07/14/24 2332     Updated: 07/15/24 0721     QT Interval 396 ms      QTC Interval 430 ms     Narrative:      Test Reason : Other~  Blood Pressure :   */*   mmHG  Vent. Rate :  71 BPM     Atrial Rate :  71 BPM     P-R Int : 156 ms          QRS Dur :  96 ms      QT Int : 396 ms       P-R-T Axes :  16  54  24 degrees     QTc Int : 430 ms    Normal sinus rhythm  Normal ECG  No previous ECGs available    Referred By: ANGELI           Confirmed By:     Adult Transthoracic Echo Complete W/ Cont if Necessary Per Protocol [[id number]] Resulted: 07/15/24 0954     Updated: 07/15/24 0958     EF(MOD-bp) 58.8 %      LVIDd 4.0 cm      LVIDs 2.5 cm      IVSd 1.00 cm      LVPWd 0.90 cm      FS 37.5 %      IVS/LVPW 1.11 cm      ESV(cubed) 15.6 ml      EDV(cubed) 64.0 ml      LV mass(C)d 118.2 grams      LVOT area 3.1 cm2      LVOT diam 2.00 cm      EDV(MOD-sp2) 87.4 ml      EDV(MOD-sp4) 117.0 ml      ESV(MOD-sp2) 34.7 ml      ESV(MOD-sp4) 46.9 ml      SV(MOD-sp2) 52.7 ml      SV(MOD-sp4) 70.1 ml      EF(MOD-sp2) 60.3 %      EF(MOD-sp4) 59.9 %      MV E max drea 69.7 cm/sec      MV A max drea  55.5 cm/sec      MV dec time 0.21 sec      MV E/A 1.26     LA ESV Index (BP) 17.3 ml/m2      Med Peak E' Gee 6.6 cm/sec      Lat Peak E' Gee 13.9 cm/sec      TR max gee 190.5 cm/sec      Avg E/e' ratio 6.80     SV(LVOT) 65.3 ml      RV Base 3.5 cm      RV Mid 3.2 cm      RV Length 9.4 cm      TAPSE (>1.6) 1.60 cm      RV S' 12.5 cm/sec      LA dimension (2D)  2.9 cm      LV V1 max 104.5 cm/sec      LV V1 max PG 4.4 mmHg      LV V1 mean PG 2.5 mmHg      LV V1 VTI 20.8 cm      Ao pk gee 144.0 cm/sec      Ao max PG 8.3 mmHg      Ao mean PG 4.5 mmHg      Ao V2 VTI 29.9 cm      SALVADOR(I,D) 2.19 cm2      MV max PG 2.6 mmHg      MV mean PG 1.00 mmHg      MV V2 VTI 24.3 cm      MV P1/2t 64.6 msec      MVA(P1/2t) 3.4 cm2      MVA(VTI) 2.7 cm2      MV dec slope 362.0 cm/sec2      TR max PG 15 mmHg      PA V2 max 112.0 cm/sec      PA acc time 0.16 sec      Ao root diam 3.4 cm      RVSP(TR) 18 mmHg      RAP systole 3 mmHg     Narrative:        Left ventricular ejection fraction appears to be 56 - 60%.    There is mild mitral valve prolapse of the anterior mitral leaflet.   Trace mitral valve regurgitation is present.                Elevated blood pressure affecting pregnancy in third trimester, antepartum    Encounter for supervision of normal first pregnancy in third trimester    Pregnancy induced hypertension      Assessment/Plan:      Cardiac risk evaluation prior to labor induction  Cardiac catheterization reviewed from July 2023.  No obstructive coronary artery disease at that time.  Echocardiogram today showing a normal ejection fraction with mild mitral valve prolapse and trace mitral valve regurgitation.  EKG normal.  Patient's heart rate and blood pressure are currently within the normal range.  I believe she can proceed with labor induction at low cardiac risk.  No special precautions needed at this time.  Please call with questions or concerns.  We will be available.          Mirza Yoon MD formerly [facility]

## 2024-07-15 NOTE — PROGRESS NOTES
"07/15/24  06:21 EDT  Ct Doe      ASSESSMENTS:  Patient still has galdamez balloon in.  No contractions noted.  Patient had sats drop to 80's.  Pulse ox was placed on due to suspicious cardiac history.  Currently on nasal canual with oxygen at 5 l/min to keep sats in 90's.  MFM consult ordered for this am.    /73 (BP Location: Left arm, Patient Position: Lying)   Pulse 68   Temp 98.5 °F (36.9 °C) (Oral)   Resp 16   Ht 162.6 cm (64\")   Wt 100 kg (221 lb)   SpO2 94%   Breastfeeding No   BMI 37.93 kg/m²     Fetal Heart Rate Assessment   Method: Fetal HR Assessment Method: (P) external   Beats/min: Fetal HR (beats/min): (P) 115   Baseline: Fetal HR Baseline: (P) normal range   Varibility: Fetal HR Variability: (P) moderate (amplitude range 6 to 25 bpm)   Accels: Fetal HR Accelerations: (P) greater than/equal to 15 bpm, lasting at least 15 seconds   Decels: Fetal HR Decelerations: (P) absent   Tracing Category:       Uterine Assessment   Method: Method: (P) external tocotransducer   Frequency (min): Contraction Frequency (Minutes): toco malfunction   Ctx Count in 10 min:     Duration:     Intensity: Contraction Intensity: (P) no contractions   Intensity by IUPC:     Resting Tone: Uterine Resting Tone: soft by palpation   Resting Tone by IUPC:     [place] Units:                                Presentation: vertex   Cervix: Exam by: Method: sterile exam per CNM   Dilation: Cervical Dilation (cm): 1 on galdamez placement but has not been checked since   Effacement:     Station:              Lab Results   Component Value Date    WBC 9.55 07/14/2024    HGB 12.4 07/14/2024    HCT 37.4 07/14/2024    MCV 82.2 07/14/2024     07/14/2024    CREATININE 0.65 07/14/2024    AST 13 07/14/2024    ALT 6 07/14/2024     Results from last 7 days   Lab Units 07/14/24 1934   ABO TYPING  O   RH TYPING  Positive   ANTIBODY SCREEN  Negative       PLAN: Await maternal echo, cardiology consult and MFM consult prior " to proceeding with induction.  Dr. Starks aware.    Martina Estrella, RAJI  06:21 EDT  07/15/24

## 2024-07-15 NOTE — NURSING NOTE
Nursing House Supervisor notified per phone of patient's admission and status (including hx of heart cath, EKG done, cardio consult in am, echocardiogram in am).

## 2024-07-15 NOTE — PROGRESS NOTES
"07/15/24  16:49 EDT  Ct Doe      ASSESSMENTS: Ct is sitting on birthing ball breathing through contractions. She plans no epidural for labor and birth.     /70 (BP Location: Left arm, Patient Position: Lying)   Pulse 64   Temp 97.8 °F (36.6 °C) (Oral)   Resp 17   Ht 162.6 cm (64.02\")   Wt 100 kg (220 lb 7.4 oz)   SpO2 98%   Breastfeeding No   BMI 37.82 kg/m²     Fetal Heart Rate Assessment   Method: Fetal HR Assessment Method: external   Beats/min: Fetal HR (beats/min): 120   Baseline: Fetal HR Baseline: normal range   Varibility: Fetal HR Variability: moderate (amplitude range 6 to 25 bpm)   Accels: Fetal HR Accelerations: greater than/equal to 15 bpm, lasting at least 15 seconds   Decels: Fetal HR Decelerations: absent   Tracing Category:  1     Uterine Assessment   Method: Method: palpation   Frequency (min): Contraction Frequency (Minutes): 4   Ctx Count in 10 min:     Duration:     Intensity: Contraction Intensity: moderate   Intensity by IUPC:     Resting Tone: Uterine Resting Tone: soft by palpation   Resting Tone by IUPC:     [place] Units:                                Presentation: vertex   Cervix: Exam by: Method: sterile exam per RN   Dilation: Cervical Dilation (cm): 5   Effacement: Cervical Effacement: 70-80%   Station: Fetal Station: -2            Lab Results   Component Value Date    WBC 9.55 07/14/2024    HGB 12.4 07/14/2024    HCT 37.4 07/14/2024    MCV 82.2 07/14/2024     07/14/2024    CREATININE 0.65 07/14/2024    AST 13 07/14/2024    ALT 6 07/14/2024     Results from last 7 days   Lab Units 07/14/24  1934   ABO TYPING  O   RH TYPING  Positive   ANTIBODY SCREEN  Negative       ASSESSMENT:  IUP at 37w2d  Decreased fetal movement with reactive, category 1 fetal heart rate tracing  Elevated systolic and diastolic blood pressure x 2    PLAN: Continue PPP. Recheck cervix as indicated by maternal-fetal status.     Delta Parikh CNM  16:49 EDT  07/15/24  "

## 2024-07-15 NOTE — PROGRESS NOTES
"Update note:  RN was reviewing her records in care everywhere and she had a cardiac cath in 7/2023.  Stated indication was \"STEMI\".  Records indicated that she needed f/u for non ischemic cardiomyopathy.  She was in ICU during this time.  Patient was asked about the circumstances surrounding her ICU admission at .  Her mother was on the phone and stated they told her to f/u with cardiology but she \"never had any more problems so she never felt it was needed\".  Patient stated she had taken 1/2 xanax off the street and there \"must have been something else mixed with it\".  When asking further she said she didn't know what she took because her mother found her unconscious in her room and called 911.  She did admit that it was probably \"fentanyl\".  She did have a positive drug screen for Fentanyl in 2/2024 at the beginning of her pregnancy  but she stated she had not taken any so came the next day for a repeat UDS which was negative.  She states she has not done any drugs since.    Dr. Starks was advised of this.  Her 12 lead EKG was normal and we will order a ECHO of her heart in the am.  Cardiology will be consulted prior to proceeding with induction other than the balloon.  Last BP was in normal range.  She states she feels some mild cramping.  Advised of the plan of care.    FHR currently category 1    Martina Estrella CNM    "

## 2024-07-15 NOTE — PROGRESS NOTES
"07/15/24  12:51 EDT  Ct Doe      ASSESSMENTS: Doing well. Normal chest xray. Normal O2 sat after changing pulse oximeter on RA. Received approval to proceed with IOL from  Cardiology. PDC in agreement. Pitocin started.     /69   Pulse 76   Temp 98 °F (36.7 °C) (Oral)   Resp 16   Ht 162.6 cm (64.02\")   Wt 100 kg (220 lb 7.4 oz)   SpO2 98%   Breastfeeding No   BMI 37.82 kg/m²     Fetal Heart Rate Assessment   Method: Fetal HR Assessment Method: external   Beats/min: Fetal HR (beats/min): 135   Baseline: Fetal HR Baseline: normal range   Varibility: Fetal HR Variability: moderate (amplitude range 6 to 25 bpm)   Accels: Fetal HR Accelerations: greater than/equal to 15 bpm, lasting at least 15 seconds   Decels: Fetal HR Decelerations: absent   Tracing Category:       Uterine Assessment   Method: Method: external tocotransducer   Frequency (min): Contraction Frequency (Minutes): 5   Ctx Count in 10 min:     Duration:     Intensity: Contraction Intensity: no contractions   Intensity by IUPC:     Resting Tone: Uterine Resting Tone: soft by palpation   Resting Tone by IUPC:     [place] Units:                                Presentation: Vertex   Cervix: Exam by: Method: sterile exam per CNM   Dilation: Cervical Dilation (cm): 4-5   Effacement: Cervical Effacement: 60%   Station: Fetal Station: -2  Offered AROM, she desires, clear fluid            Lab Results   Component Value Date    WBC 9.55 07/14/2024    HGB 12.4 07/14/2024    HCT 37.4 07/14/2024    MCV 82.2 07/14/2024     07/14/2024    CREATININE 0.65 07/14/2024    AST 13 07/14/2024    ALT 6 07/14/2024     Results from last 7 days   Lab Units 07/14/24 1934   ABO TYPING  O   RH TYPING  Positive   ANTIBODY SCREEN  Negative       PLAN: Cont PPP. Epidural vs IV pain meds if desires.     Bernie Higgins CNM  12:51 EDT  07/15/24  "

## 2024-07-15 NOTE — PROGRESS NOTES
"07/15/24  08:38 EDT  Ct Doe      ASSESSMENTS: Reports doing well overall with no concerns. Remains on O2. Current sats 97%. Awaiting echo and consult from [facility] for continues plan of care. FB out.     /63   Pulse 76   Temp 98 °F (36.7 °C) (Oral)   Resp 16   Ht 162.6 cm (64\")   Wt 100 kg (221 lb)   SpO2 95%   Breastfeeding No   BMI 37.93 kg/m²     Fetal Heart Rate Assessment   Method: Fetal HR Assessment Method: external   Beats/min: Fetal HR (beats/min): 130   Baseline: Fetal HR Baseline: normal range   Varibility: Fetal HR Variability: moderate (amplitude range 6 to 25 bpm)   Accels: Fetal HR Accelerations: greater than/equal to 15 bpm, lasting at least 15 seconds   Decels: Fetal HR Decelerations: absent   Tracing Category:       Uterine Assessment   Method: Method: external tocotransducer   Frequency (min): Contraction Frequency (Minutes): toco malfunction   Ctx Count in 10 min:     Duration:     Intensity: Contraction Intensity: no contractions   Intensity by IUPC:     Resting Tone: Uterine Resting Tone: soft by palpation   Resting Tone by IUPC:     [place] Units:                                Presentation: vertex   Cervix: Deferred at this time while awaiting POC, most recent Exam by: Method: sterile exam per [facility]   Dilation: Cervical Dilation (cm): 1   Effacement:     Station:              Lab Results   Component Value Date    WBC 9.55 07/14/2024    HGB 12.4 07/14/2024    HCT 37.4 07/14/2024    MCV 82.2 07/14/2024     07/14/2024    CREATININE 0.65 07/14/2024    AST 13 07/14/2024    ALT 6 07/14/2024     Results from last 7 days   Lab Units 07/14/24  1934   ABO TYPING  O   RH TYPING  Positive   ANTIBODY SCREEN  Negative       PLAN:Await maternal echo, cardiology consult and [facility] consult prior to proceeding with induction. Dr. Marvin christianson.     Bernie Higgins CNM  08:38 EDT  07/15/24  "

## 2024-07-16 PROBLEM — O13.9 PREGNANCY INDUCED HYPERTENSION: Status: RESOLVED | Noted: 2024-07-14 | Resolved: 2024-07-16

## 2024-07-16 PROBLEM — Z98.891 S/P PRIMARY LOW TRANSVERSE C-SECTION: Status: ACTIVE | Noted: 2024-07-16

## 2024-07-16 PROCEDURE — 25010000002 HYDROMORPHONE PER 4 MG: Performed by: ANESTHESIOLOGY

## 2024-07-16 PROCEDURE — 25010000002 KETOROLAC TROMETHAMINE PER 15 MG: Performed by: OBSTETRICS & GYNECOLOGY

## 2024-07-16 PROCEDURE — 25810000003 LACTATED RINGERS PER 1000 ML: Performed by: ADVANCED PRACTICE MIDWIFE

## 2024-07-16 RX ORDER — IBUPROFEN 600 MG/1
600 TABLET ORAL EVERY 6 HOURS
Status: DISCONTINUED | OUTPATIENT
Start: 2024-07-17 | End: 2024-07-18 | Stop reason: HOSPADM

## 2024-07-16 RX ORDER — IBUPROFEN 600 MG/1
600 TABLET ORAL EVERY 6 HOURS PRN
Status: DISCONTINUED | OUTPATIENT
Start: 2024-07-16 | End: 2024-07-16 | Stop reason: HOSPADM

## 2024-07-16 RX ORDER — KETOROLAC TROMETHAMINE 15 MG/ML
15 INJECTION, SOLUTION INTRAMUSCULAR; INTRAVENOUS EVERY 6 HOURS
Status: COMPLETED | OUTPATIENT
Start: 2024-07-16 | End: 2024-07-17

## 2024-07-16 RX ORDER — OXYCODONE AND ACETAMINOPHEN 10; 325 MG/1; MG/1
2 TABLET ORAL EVERY 4 HOURS PRN
Status: DISCONTINUED | OUTPATIENT
Start: 2024-07-16 | End: 2024-07-16 | Stop reason: HOSPADM

## 2024-07-16 RX ORDER — CARBOPROST TROMETHAMINE 250 UG/ML
250 INJECTION, SOLUTION INTRAMUSCULAR AS NEEDED
Status: DISCONTINUED | OUTPATIENT
Start: 2024-07-16 | End: 2024-07-16 | Stop reason: HOSPADM

## 2024-07-16 RX ORDER — OXYTOCIN/0.9 % SODIUM CHLORIDE 30/500 ML
999 PLASTIC BAG, INJECTION (ML) INTRAVENOUS ONCE
Status: COMPLETED | OUTPATIENT
Start: 2024-07-16 | End: 2024-07-16

## 2024-07-16 RX ORDER — OXYCODONE HYDROCHLORIDE AND ACETAMINOPHEN 5; 325 MG/1; MG/1
1 TABLET ORAL EVERY 4 HOURS PRN
Status: DISCONTINUED | OUTPATIENT
Start: 2024-07-16 | End: 2024-07-16 | Stop reason: HOSPADM

## 2024-07-16 RX ORDER — MISOPROSTOL 200 UG/1
600 TABLET ORAL AS NEEDED
Status: DISCONTINUED | OUTPATIENT
Start: 2024-07-16 | End: 2024-07-18 | Stop reason: HOSPADM

## 2024-07-16 RX ORDER — ONDANSETRON 4 MG/1
4 TABLET, ORALLY DISINTEGRATING ORAL EVERY 6 HOURS PRN
Status: DISCONTINUED | OUTPATIENT
Start: 2024-07-16 | End: 2024-07-18 | Stop reason: HOSPADM

## 2024-07-16 RX ORDER — HYDROCORTISONE 25 MG/G
1 CREAM TOPICAL AS NEEDED
Status: DISCONTINUED | OUTPATIENT
Start: 2024-07-16 | End: 2024-07-18 | Stop reason: HOSPADM

## 2024-07-16 RX ORDER — KETOROLAC TROMETHAMINE 30 MG/ML
30 INJECTION, SOLUTION INTRAMUSCULAR; INTRAVENOUS ONCE
Status: DISCONTINUED | OUTPATIENT
Start: 2024-07-16 | End: 2024-07-16

## 2024-07-16 RX ORDER — OXYTOCIN/0.9 % SODIUM CHLORIDE 30/500 ML
250 PLASTIC BAG, INJECTION (ML) INTRAVENOUS CONTINUOUS
Status: ACTIVE | OUTPATIENT
Start: 2024-07-16 | End: 2024-07-16

## 2024-07-16 RX ORDER — MISOPROSTOL 200 UG/1
800 TABLET ORAL AS NEEDED
Status: DISCONTINUED | OUTPATIENT
Start: 2024-07-16 | End: 2024-07-16 | Stop reason: HOSPADM

## 2024-07-16 RX ORDER — PRENATAL VIT/IRON FUM/FOLIC AC 27MG-0.8MG
1 TABLET ORAL DAILY
Status: DISCONTINUED | OUTPATIENT
Start: 2024-07-16 | End: 2024-07-18 | Stop reason: HOSPADM

## 2024-07-16 RX ORDER — METHYLERGONOVINE MALEATE 0.2 MG/ML
200 INJECTION INTRAVENOUS ONCE AS NEEDED
Status: DISCONTINUED | OUTPATIENT
Start: 2024-07-16 | End: 2024-07-16 | Stop reason: HOSPADM

## 2024-07-16 RX ORDER — OXYTOCIN/0.9 % SODIUM CHLORIDE 30/500 ML
125 PLASTIC BAG, INJECTION (ML) INTRAVENOUS ONCE AS NEEDED
Status: DISCONTINUED | OUTPATIENT
Start: 2024-07-16 | End: 2024-07-18 | Stop reason: HOSPADM

## 2024-07-16 RX ORDER — ALUMINA, MAGNESIA, AND SIMETHICONE 2400; 2400; 240 MG/30ML; MG/30ML; MG/30ML
15 SUSPENSION ORAL EVERY 4 HOURS PRN
Status: DISCONTINUED | OUTPATIENT
Start: 2024-07-16 | End: 2024-07-18 | Stop reason: HOSPADM

## 2024-07-16 RX ORDER — DIPHENHYDRAMINE HCL 25 MG
25 CAPSULE ORAL EVERY 4 HOURS PRN
Status: DISCONTINUED | OUTPATIENT
Start: 2024-07-16 | End: 2024-07-18 | Stop reason: HOSPADM

## 2024-07-16 RX ORDER — HYDROMORPHONE HYDROCHLORIDE 1 MG/ML
0.5 INJECTION, SOLUTION INTRAMUSCULAR; INTRAVENOUS; SUBCUTANEOUS
Status: DISCONTINUED | OUTPATIENT
Start: 2024-07-16 | End: 2024-07-16 | Stop reason: HOSPADM

## 2024-07-16 RX ORDER — ACETAMINOPHEN 325 MG/1
650 TABLET ORAL EVERY 6 HOURS
Status: DISCONTINUED | OUTPATIENT
Start: 2024-07-17 | End: 2024-07-18 | Stop reason: HOSPADM

## 2024-07-16 RX ORDER — POLYETHYLENE GLYCOL 3350 17 G/17G
17 POWDER, FOR SOLUTION ORAL DAILY
Status: DISCONTINUED | OUTPATIENT
Start: 2024-07-16 | End: 2024-07-18 | Stop reason: HOSPADM

## 2024-07-16 RX ORDER — ONDANSETRON 2 MG/ML
4 INJECTION INTRAMUSCULAR; INTRAVENOUS EVERY 6 HOURS PRN
Status: DISCONTINUED | OUTPATIENT
Start: 2024-07-16 | End: 2024-07-16 | Stop reason: HOSPADM

## 2024-07-16 RX ORDER — ONDANSETRON 4 MG/1
4 TABLET, ORALLY DISINTEGRATING ORAL EVERY 6 HOURS PRN
Status: DISCONTINUED | OUTPATIENT
Start: 2024-07-16 | End: 2024-07-16 | Stop reason: HOSPADM

## 2024-07-16 RX ORDER — ACETAMINOPHEN 325 MG/1
650 TABLET ORAL EVERY 4 HOURS PRN
Status: DISCONTINUED | OUTPATIENT
Start: 2024-07-16 | End: 2024-07-16 | Stop reason: HOSPADM

## 2024-07-16 RX ORDER — SIMETHICONE 80 MG
80 TABLET,CHEWABLE ORAL 4 TIMES DAILY PRN
Status: DISCONTINUED | OUTPATIENT
Start: 2024-07-16 | End: 2024-07-18 | Stop reason: HOSPADM

## 2024-07-16 RX ORDER — CALCIUM CARBONATE 500 MG/1
1 TABLET, CHEWABLE ORAL EVERY 4 HOURS PRN
Status: DISCONTINUED | OUTPATIENT
Start: 2024-07-16 | End: 2024-07-18 | Stop reason: HOSPADM

## 2024-07-16 RX ORDER — ACETAMINOPHEN 500 MG
1000 TABLET ORAL EVERY 6 HOURS
Status: COMPLETED | OUTPATIENT
Start: 2024-07-16 | End: 2024-07-16

## 2024-07-16 RX ORDER — OXYCODONE HYDROCHLORIDE 10 MG/1
10 TABLET ORAL EVERY 4 HOURS PRN
Status: DISCONTINUED | OUTPATIENT
Start: 2024-07-16 | End: 2024-07-18 | Stop reason: HOSPADM

## 2024-07-16 RX ORDER — METHYLERGONOVINE MALEATE 0.2 MG/ML
200 INJECTION INTRAVENOUS AS NEEDED
Status: DISCONTINUED | OUTPATIENT
Start: 2024-07-16 | End: 2024-07-18 | Stop reason: HOSPADM

## 2024-07-16 RX ORDER — ONDANSETRON 2 MG/ML
4 INJECTION INTRAMUSCULAR; INTRAVENOUS EVERY 6 HOURS PRN
Status: DISCONTINUED | OUTPATIENT
Start: 2024-07-16 | End: 2024-07-18 | Stop reason: HOSPADM

## 2024-07-16 RX ORDER — CARBOPROST TROMETHAMINE 250 UG/ML
250 INJECTION, SOLUTION INTRAMUSCULAR AS NEEDED
Status: DISCONTINUED | OUTPATIENT
Start: 2024-07-16 | End: 2024-07-18 | Stop reason: HOSPADM

## 2024-07-16 RX ORDER — DOCUSATE SODIUM 100 MG/1
100 CAPSULE, LIQUID FILLED ORAL 2 TIMES DAILY PRN
Status: DISCONTINUED | OUTPATIENT
Start: 2024-07-16 | End: 2024-07-18 | Stop reason: HOSPADM

## 2024-07-16 RX ORDER — KETOROLAC TROMETHAMINE 30 MG/ML
30 INJECTION, SOLUTION INTRAMUSCULAR; INTRAVENOUS ONCE
Status: COMPLETED | OUTPATIENT
Start: 2024-07-16 | End: 2024-07-16

## 2024-07-16 RX ORDER — OXYCODONE HYDROCHLORIDE 5 MG/1
5 TABLET ORAL EVERY 4 HOURS PRN
Status: DISCONTINUED | OUTPATIENT
Start: 2024-07-16 | End: 2024-07-18 | Stop reason: HOSPADM

## 2024-07-16 RX ADMIN — PRENATAL VITAMINS-IRON FUMARATE 27 MG IRON-FOLIC ACID 0.8 MG TABLET 1 TABLET: at 08:22

## 2024-07-16 RX ADMIN — KETOROLAC TROMETHAMINE 15 MG: 15 INJECTION, SOLUTION INTRAMUSCULAR; INTRAVENOUS at 19:54

## 2024-07-16 RX ADMIN — ACETAMINOPHEN 1000 MG: 500 TABLET ORAL at 10:40

## 2024-07-16 RX ADMIN — KETOROLAC TROMETHAMINE 15 MG: 15 INJECTION, SOLUTION INTRAMUSCULAR; INTRAVENOUS at 08:22

## 2024-07-16 RX ADMIN — ACETAMINOPHEN 1000 MG: 500 TABLET ORAL at 05:29

## 2024-07-16 RX ADMIN — HYDROMORPHONE HYDROCHLORIDE 0.5 MG: 1 INJECTION, SOLUTION INTRAMUSCULAR; INTRAVENOUS; SUBCUTANEOUS at 01:59

## 2024-07-16 RX ADMIN — Medication 999 ML/HR: at 00:37

## 2024-07-16 RX ADMIN — ACETAMINOPHEN 1000 MG: 500 TABLET ORAL at 23:00

## 2024-07-16 RX ADMIN — ACETAMINOPHEN 1000 MG: 500 TABLET ORAL at 17:56

## 2024-07-16 RX ADMIN — KETOROLAC TROMETHAMINE 30 MG: 30 INJECTION, SOLUTION INTRAMUSCULAR; INTRAVENOUS at 00:37

## 2024-07-16 RX ADMIN — HYDROMORPHONE HYDROCHLORIDE 0.5 MG: 1 INJECTION, SOLUTION INTRAMUSCULAR; INTRAVENOUS; SUBCUTANEOUS at 01:03

## 2024-07-16 RX ADMIN — KETOROLAC TROMETHAMINE 15 MG: 15 INJECTION, SOLUTION INTRAMUSCULAR; INTRAVENOUS at 14:38

## 2024-07-16 RX ADMIN — SODIUM CHLORIDE, POTASSIUM CHLORIDE, SODIUM LACTATE AND CALCIUM CHLORIDE 125 ML/HR: 600; 310; 30; 20 INJECTION, SOLUTION INTRAVENOUS at 01:59

## 2024-07-16 NOTE — ANESTHESIA PREPROCEDURE EVALUATION
Anesthesia Evaluation     Patient summary reviewed and Nursing notes reviewed   NPO Solid Status: > 8 hours  NPO Liquid Status: > 2 hours           Airway   Mallampati: II  TM distance: >3 FB  Neck ROM: full  No difficulty expected  Dental      Pulmonary - negative pulmonary ROS    breath sounds clear to auscultation  Cardiovascular     Rhythm: regular  Rate: normal    (+) past MI  >12 months    ROS comment: Echo report reviewed    Neuro/Psych- negative ROS  GI/Hepatic/Renal/Endo    (+) obesity    Musculoskeletal (-) negative ROS        ROS comment: Severe thoracolumbar scoliosis  Abdominal    Substance History - negative use     OB/GYN    (+) Pregnant, pregnancy induced hypertension        Other                    Anesthesia Plan    ASA 3     general   Rapid sequence    Anesthetic plan, risks, benefits, and alternatives have been provided, discussed and informed consent has been obtained with: patient.    Use of blood products discussed with patient .      CODE STATUS:    Level Of Support Discussed With: Patient  Code Status (Patient has no pulse and is not breathing): CPR (Attempt to Resuscitate)  Medical Interventions (Patient has pulse or is breathing): Full Support

## 2024-07-16 NOTE — OP NOTE
Primary Low Transverse  Section Procedure Note    Ct Doe    2024     Indications: 1. IUP at 37w2d   2. IOL due to GHTN   3. Arrest of dilation   4. Severe scoliosis with inability to place regional anesthesia   5. Class 2 obesity (BMI 37)    Pre-operative Diagnosis: 37w3d    Post-operative Diagnosis: same    Findings: VMI in vertex presentation; normal appearing maternal anatomy    Birth Information  YOB: 2025   Time of birth: 11:21 PM   Delivering clinician: Ashley Wong   Sex: male   Delivery type: , Low Transverse   Breech type (if applicable):     Observed anomalies/comments:         Estimated Blood Loss:  528cc           Drains: [place]                 Specimens: placenta (not sent to pathology)               Complications:  None; patient tolerated the procedure well.           Disposition: PACU - hemodynamically stable.           Condition: stable    Surgeon: Ashley Wong MD     Assistants: thien; Derrek De Jesus MD - his assistance was required for retraction and fundal pressure and was necessary for the success of the case    Anesthesia: General endotracheal anesthesia    ASA Class: 3    Procedure Details   The patient was seen in the Holding Room. The risks, benefits, complications, treatment options, and expected outcomes were discussed with the patient.  The patient concurred with the proposed plan, giving informed consent.  The site of surgery was properly noted. The patient was taken to the Operating Room, identified as Ct Doe and the procedure verified as  Delivery. A Time Out was held and the above information confirmed.    The patient was taken to the operating room where general endotracheal anesthesia was placed and was found to be adequate. She was prepped and draped in the usual sterile fashion in the dorsal supine position with a leftward tilt. A Pfannenstiel skin incision was made with the scalpel and carried through to  the underlying layer of fascia using the scalpel. The fascia was then nicked in the midline, and the fascial incision was extended laterally in a blunt fashion. The rectus muscles were then  in the midline and the peritoneum was identified. The peritoneum was entered bluntly, and this incision was extended superiorly and inferiorly with good visualization of underlying structures.  The bladder blade was then inserted. A bladder flap was not turned down. The lower uterine segment was identified and a low transverse uterine incision was made using the scalpel. Delivered from vertex presentation was a [place] Measurements  Weight (oz): 136.16    Length (in): 19.5    Head circumference (in):      Chest circumference (in):    with apgars scores of         APGARS  One minute Five minutes Ten minutes Fifteen minutes Twenty minutes   Skin color: 0   1             Heart rate: 2   2             Grimace: 2   2              Muscle tone: 2   2              Breathin   2              Totals: 8   9              . The nose and mouth were suctioned, the cord was clamped and cut, and the infant was handed off to the awaiting Delivery Room Team. Cord blood was then obtained. The placenta was removed intact and appeared normal. The uterus was then exteriorized from the abdominal cavity and cleared of all clots and debris. The uterine outline, tubes and ovaries appeared normal. The hysterotomy was then closed using 1 Monocryl in a running, locked fashion. Hemostasis was observed.  The uterus was placed back inside the abdominal cavity, and the gutters were cleared of clots and debris. The hysterotomy was again reexamined and excellent hemostasis continued to be noted. The fascia was then reapproximated with running sutures of 0 PDS. The incision was then irrigated, and the subcutaneous tissue was reapproximated with 3-0 plain. The skin was reapproximated with 4-0 Monocryl and Skin glue.    Instrument, sponge, and needle  counts were correct prior the abdominal closure and at the conclusion of the case.         Ashley Wong MD  00:03 EDT  7/16/2024

## 2024-07-16 NOTE — PROGRESS NOTES
Belle Doe  : 2005  MRN: [id number]  CSN: [id number]    Labor progress note    Subjective   She is feeling painful contraction.     Objective   Min/max vitals past 24 hours:  Temp  Min: 97.8 °F (36.6 °C)  Max: 99.3 °F (37.4 °C)   BP  Min: 115/63  Max: 128/66   Pulse  Min: 54  Max: 139   Resp  Min: 16  Max: 18        FHT's: reassuring and category 1   Cervix: was checked (by me): 5-6 cm / 90 % / -2   Contractions: regular every 3 minutes - external monitors used        Assessment   IUP at 37w2d  Fetal status reassuring     Plan    section  With minimal cervical change, patient is requesting  delivery. I answered questions of possibilities why she has had little progress in labor. We discussed that an increase in oxytocin would need to occur to continue with induction efforts. I explained the eventual possibility of placing an IUPC to ensure adequate labor contractions before I would call failure to progress in labor. I explained that as long as she and infant are doing well, we could continue with induction efforts. I explained that I cannot accurately predict the length of time that remains in this labor process. The patient has decided she would like to proceed with  delivery now with knowledge of likely general anesthesia with risks discussed. Dr. Wong and Dr. Hodge updated on patient desire.     Delta Parikh CNM  7/15/2024  22:20 EDT

## 2024-07-16 NOTE — PLAN OF CARE
Problem: Adult Inpatient Plan of Care  Goal: Plan of Care Review  Outcome: Ongoing, Progressing  Goal: Patient-Specific Goal (Individualized)  Outcome: Ongoing, Progressing  Goal: Absence of Hospital-Acquired Illness or Injury  Outcome: Ongoing, Progressing  Intervention: Identify and Manage Fall Risk  Recent Flowsheet Documentation  Taken 2024 0400 by Bev Kim RN  Safety Promotion/Fall Prevention: safety round/check completed  Taken 2024 by Bev Kim RN  Safety Promotion/Fall Prevention:   clutter free environment maintained   assistive device/personal items within reach   fall prevention program maintained   nonskid shoes/slippers when out of bed   room organization consistent  Intervention: Prevent Skin Injury  Recent Flowsheet Documentation  Taken 2024 by Bev Kim RN  Body Position: position changed independently  Intervention: Prevent and Manage VTE (Venous Thromboembolism) Risk  Recent Flowsheet Documentation  Taken 2024 0529 by Bev Kim RN  Activity Management:   ambulated in room   up in chair  Taken 2024 022 by Bev Kim RN  Activity Management: bedrest  Taken 2024 by Bev Kim RN  Activity Management: up ad georgie  VTE Prevention/Management: sequential compression devices on  Goal: Optimal Comfort and Wellbeing  Outcome: Ongoing, Progressing  Intervention: Provide Person-Centered Care  Recent Flowsheet Documentation  Taken 2024 by Bev Kim RN  Trust Relationship/Rapport:   care explained   choices provided   emotional support provided   questions answered   questions encouraged   reassurance provided  Goal: Readiness for Transition of Care  Outcome: Ongoing, Progressing     Problem: Skin Injury Risk Increased  Goal: Skin Health and Integrity  Outcome: Ongoing, Progressing     Problem: Adjustment to Role Transition (Postpartum  Delivery)  Goal: Successful Maternal Role Transition  Outcome: Ongoing,  Progressing     Problem: Bleeding (Postpartum  Delivery)  Goal: Hemostasis  Outcome: Ongoing, Progressing     Problem: Infection (Postpartum  Delivery)  Goal: Absence of Infection Signs and Symptoms  Outcome: Ongoing, Progressing     Problem: Pain (Postpartum  Delivery)  Goal: Acceptable Pain Control  Outcome: Ongoing, Progressing     Problem: Postoperative Nausea and Vomiting (Postpartum  Delivery)  Goal: Nausea and Vomiting Relief  Outcome: Ongoing, Progressing     Problem: Postoperative Urinary Retention (Postpartum  Delivery)  Goal: Effective Urinary Elimination  Outcome: Ongoing, Progressing   Goal Outcome Evaluation:

## 2024-07-16 NOTE — ANESTHESIA POSTPROCEDURE EVALUATION
Patient: Ct Doe    Procedure Summary       Date: 07/15/24 Room / Location: [facility] LABOR DELIVERY   [facility] LABOR DELIVERY    Anesthesia Start:  Anesthesia Stop: 24 0004    Procedure:  SECTION PRIMARY (Abdomen) Diagnosis:     Surgeons: Ashley Wong MD Provider: Fercho Hodge DO    Anesthesia Type: general ASA Status: 3            Anesthesia Type: general    Vitals  Vitals Value Taken Time   /68 24 1220   Temp 97.7 °F (36.5 °C) 24 1220   Pulse 79 24 1220   Resp 16 24 1220   SpO2 98 % 24 0815           Post Anesthesia Care and Evaluation    Patient location during evaluation: bedside  Patient participation: complete - patient participated  Level of consciousness: awake and alert  Pain management: adequate    Airway patency: patent  Anesthetic complications: No anesthetic complications    Cardiovascular status: acceptable  Respiratory status: acceptable  Hydration status: acceptable  Post Neuraxial Block status: Motor and sensory function returned to baseline and No signs or symptoms of PDPH

## 2024-07-16 NOTE — PAYOR COMM NOTE
"Brook Ct Farias (18 y.o. Female)     Notification of Delivery  ACC/Utilization Review  Phone: [phone]  Fax: [phone]    [place] [address]          Date of Birth   2005    Social Security Number       Address   [address] [place] [address]    Home Phone   [phone]    MRN   [id number]       Restorationism   None    Marital Status   Single                            Admission Date   7/14/24    Admission Type   Elective    Admitting Provider   Ashley Wong MD    Attending Provider   Bernie Higgins CNM    Department, Room/Bed   [facility]ABY 4B, N428/1       Discharge Date       Discharge Disposition       Discharge Destination                                 Attending Provider: Bernie Higgins CNM    Allergies: No Known Allergies    Isolation: None   Infection: None   Code Status: CPR    Ht: 162.6 cm (64.02\")   Wt: 100 kg (220 lb 7.4 oz)    Admission Cmt: None   Principal Problem: Elevated blood pressure affecting pregnancy in third trimester, antepartum [O16.3]                   Active Insurance as of 7/14/2024       Primary Coverage       Payor Plan Insurance Group Employer/Plan Group    [facility] MEDICAID        Payor Plan Address Payor Plan Phone Number Payor Plan Fax Number Effective Dates    [address] [address] [phone]  12/21/2022 - None Entered    [facility] [address]         Subscriber Name Subscriber Birth Date Member ID       CT MAJOR 2005 [id number]                     Emergency Contacts        (Rel.) Home Phone Work Phone Mobile Phone    Corrine Lilly (Mother) [phone] -- [phone]              Insurance Information                  [facility] MEDICAID Phone: [phone]    Subscriber: Ct Major Subscriber#: [id number]    Group#: -- Precert#: --             History & Physical        Saurav Cordero MD at " "24 1820           Belle Doe  : 2005  MRN: [id number]  CSN: [id number]    History and Physical    Subjective  Chief Complaint   Patient presents with    Decreased Fetal Movement     Ct Doe is a 18 y.o. year old  with an Estimated Date of Delivery: 8/3/24 currently at 37w1d presenting with decreased fetal movement for less than 24 hours.  Denies leaking of fluid or bleeding.  Denies contractions.    Prenatal care has been with [facility].  It has been benign.    OB History    Para Term  AB Living   1 0 0 0 0 0   SAB IAB Ectopic Molar Multiple Live Births   0 0 0 0 0 0      # Outcome Date GA Lbr Ganga/2nd Weight Sex Type Anes PTL Lv   1 Current                No past medical history on file.    No past surgical history on file.    Medications Prior to Admission   Medication Sig Dispense Refill Last Dose    calcium carbonate (TUMS) 500 MG chewable tablet Chew 1 tablet Daily.   Past Week       No Known Allergies  Social History    Tobacco Use      Smoking status: Never      Smokeless tobacco: Not on file    Review of Systems   Constitutional:  Negative for unexpected weight change.   Respiratory:  Negative for cough and shortness of breath.    Cardiovascular:  Negative for chest pain.   Gastrointestinal:  Negative for abdominal distention, constipation and diarrhea.        No persistent bloating   Genitourinary:  Negative for difficulty urinating, dysuria, hematuria and urgency.        No significant incontinence   Skin:  Negative for rash.   Neurological:  Negative for headaches.         Objective  /95   Pulse 86   Resp 16   Ht 162.6 cm (64\")   SpO2 96%   General: well developed; well nourished  no acute distress   Abdomen: soft, non-tender; no masses  no umbilical or inguinal hernias are present  no hepato-splenomegaly   FHT's: reassuring and category 1      Cervix: was not checked.   Presentation: cephalic   Contractions: none "     Prenatal Labs  Lab Results   Component Value Date    HGB 13.0 2024    RUBELLAABIGG 1.07 2024    HEPBSAG Non-Reactive 2024    TREPONEMAP Non Reactive 05/15/2024    ABSCRN Negative 05/15/2024    FFZ3CHT4 Non-Reactive 2024    HEPCVIRUSABY Non-Reactive 2024    GCT 79 05/15/2024    URINECX <10,000 CFU/mL Normal Urogenital Victorina 2024    CHLAMNAA Negative 2024    NGONORRHON Negative 2024        Assessment  IUP at 37w1d  Decreased fetal movement with reactive, category 1 fetal heart rate tracing  Elevated systolic and diastolic blood pressure x 1     Plan  Observe for the next hour to check blood pressure  If blood pressures come back down to normal okay for discharge to home with short interval follow-up to recheck blood pressure in clinic this week  If blood pressures remain elevated, discussed with her primary OB on-call but anticipate given gestational age will admit for further evaluation with anticipated induction of labor    Saurav Cordero MD  2024  18:32 EDT          Non Stress Test    [facility]    Patient: Ct Doe  : 2005  MRN: [id number]  CSN: [id number]  Date: 2024    Estimated Date of Delivery: 8/3/24  Gestational Age: 37w1d    Indication for NST decreased fetal movement       Total Time on NST > 20 minutes       Interpretation    Baseline  beats per minute   Category 1   Decelerations Absent       Additional Comments none       This note has been electronically signed.    Saurav Cordero MD  2024  18:32 EDT      Electronically signed by Saurav Cordero MD at 24 1833       H&P signed by [employer], [place] at 24 2347         [Media Unavailable] Scan on 2024 2346 by [employer], [place]: PRENATAL RECORD          Electronically signed by [employer], [place] at 24 2347          Operative/Procedure Notes (last 48 hours)        Ashley Wong MD at 24 0000          Primary Low  Transverse  Section Procedure Note    Ct Doe    2024     Indications: 1. IUP at 37w2d   2. IOL due to GHTN   3. Arrest of dilation   4. Severe scoliosis with inability to place regional anesthesia   5. Class 2 obesity (BMI 37)    Pre-operative Diagnosis: 37w3d    Post-operative Diagnosis: same    Findings: VMI in vertex presentation; normal appearing maternal anatomy    Birth Information  YOB: 2025   Time of birth: 11:21 PM   Delivering clinician: Ashley Wong   Sex: male   Delivery type: , Low Transverse   Breech type (if applicable):     Observed anomalies/comments:         Estimated Blood Loss:  528cc           Drains: [place]                 Specimens: placenta (not sent to pathology)               Complications:  None; patient tolerated the procedure well.           Disposition: PACU - hemodynamically stable.           Condition: stable    Surgeon: Ashley Wong MD     Assistants: thien; Derrek De Jesus MD - his assistance was required for retraction and fundal pressure and was necessary for the success of the case    Anesthesia: General endotracheal anesthesia    ASA Class: 3    Procedure Details   The patient was seen in the Holding Room. The risks, benefits, complications, treatment options, and expected outcomes were discussed with the patient.  The patient concurred with the proposed plan, giving informed consent.  The site of surgery was properly noted. The patient was taken to the Operating Room, identified as Ct Doe and the procedure verified as  Delivery. A Time Out was held and the above information confirmed.    The patient was taken to the operating room where general endotracheal anesthesia was placed and was found to be adequate. She was prepped and draped in the usual sterile fashion in the dorsal supine position with a leftward tilt. A Pfannenstiel skin incision was made with the scalpel and carried through to the  underlying layer of fascia using the scalpel. The fascia was then nicked in the midline, and the fascial incision was extended laterally in a blunt fashion. The rectus muscles were then  in the midline and the peritoneum was identified. The peritoneum was entered bluntly, and this incision was extended superiorly and inferiorly with good visualization of underlying structures.  The bladder blade was then inserted. A bladder flap was not turned down. The lower uterine segment was identified and a low transverse uterine incision was made using the scalpel. Delivered from vertex presentation was a [place] Measurements  Weight (oz): 136.16    Length (in): 19.5    Head circumference (in):      Chest circumference (in):    with apgars scores of         APGARS  One minute Five minutes Ten minutes Fifteen minutes Twenty minutes   Skin color: 0   1             Heart rate: 2   2             Grimace: 2   2              Muscle tone: 2   2              Breathin   2              Totals: 8   9              . The nose and mouth were suctioned, the cord was clamped and cut, and the infant was handed off to the awaiting Delivery Room Team. Cord blood was then obtained. The placenta was removed intact and appeared normal. The uterus was then exteriorized from the abdominal cavity and cleared of all clots and debris. The uterine outline, tubes and ovaries appeared normal. The hysterotomy was then closed using 1 Monocryl in a running, locked fashion. Hemostasis was observed.  The uterus was placed back inside the abdominal cavity, and the gutters were cleared of clots and debris. The hysterotomy was again reexamined and excellent hemostasis continued to be noted. The fascia was then reapproximated with running sutures of 0 PDS. The incision was then irrigated, and the subcutaneous tissue was reapproximated with 3-0 plain. The skin was reapproximated with 4-0 Monocryl and Skin glue.    Instrument, sponge, and needle counts  were correct prior the abdominal closure and at the conclusion of the case.         Ashley Wong MD  00:03 EDT  2024        Electronically signed by Ashley Wong MD at 24       Ashley Wong MD at 24          Primary Low Transverse  Section Procedure Note    Ct Doe    2024     Indications: 1. IUP at 37w2d   2. IOL due to GHTN   3. Arrest of dilation   4. Severe scoliosis with inability to place regional anesthesia   5. Class 2 obesity (BMI 37)    Pre-operative Diagnosis: 37w3d    Post-operative Diagnosis: same    Findings: VMI in vertex presentation; normal appearing maternal anatomy    Birth Information  YOB: 2025   Time of birth: 11:21 PM   Delivering clinician: Ashley Wong   Sex: male   Delivery type: , Low Transverse   Breech type (if applicable):     Observed anomalies/comments:         Estimated Blood Loss:  528cc           Drains: [place]                 Specimens: placenta (not sent to pathology)               Complications:  None; patient tolerated the procedure well.           Disposition: PACU - hemodynamically stable.           Condition: stable    Surgeon: Ashley Wong MD     Assistants: thien; Derrek De Jesus MD - his assistance was required for retraction and fundal pressure and was necessary for the success of the case    Anesthesia: General endotracheal anesthesia    ASA Class: 3    Procedure Details   The patient was seen in the Holding Room. The risks, benefits, complications, treatment options, and expected outcomes were discussed with the patient.  The patient concurred with the proposed plan, giving informed consent.  The site of surgery was properly noted. The patient was taken to the Operating Room, identified as Ct Doe and the procedure verified as  Delivery. A Time Out was held and the above information confirmed.    The patient was taken to the operating room where general  endotracheal anesthesia was placed and was found to be adequate. She was prepped and draped in the usual sterile fashion in the dorsal supine position with a leftward tilt. A Pfannenstiel skin incision was made with the scalpel and carried through to the underlying layer of fascia using the scalpel. The fascia was then nicked in the midline, and the fascial incision was extended laterally in a blunt fashion. The rectus muscles were then  in the midline and the peritoneum was identified. The peritoneum was entered bluntly, and this incision was extended superiorly and inferiorly with good visualization of underlying structures.  The bladder blade was then inserted. A bladder flap was not turned down. The lower uterine segment was identified and a low transverse uterine incision was made using the scalpel. Delivered from vertex presentation was a [place] Measurements  Weight (oz): 136.16    Length (in): 19.5    Head circumference (in):      Chest circumference (in):    with apgars scores of         APGARS  One minute Five minutes Ten minutes Fifteen minutes Twenty minutes   Skin color: 0   1             Heart rate: 2   2             Grimace: 2   2              Muscle tone: 2   2              Breathin   2              Totals: 8   9              . The nose and mouth were suctioned, the cord was clamped and cut, and the infant was handed off to the awaiting Delivery Room Team. Cord blood was then obtained. The placenta was removed intact and appeared normal. The uterus was then exteriorized from the abdominal cavity and cleared of all clots and debris. The uterine outline, tubes and ovaries appeared normal. The hysterotomy was then closed using 1 Monocryl in a running, locked fashion. Hemostasis was observed.  The uterus was placed back inside the abdominal cavity, and the gutters were cleared of clots and debris. The hysterotomy was again reexamined and excellent hemostasis continued to be noted. The fascia  was then reapproximated with running sutures of 0 PDS. The incision was then irrigated, and the subcutaneous tissue was reapproximated with 3-0 plain. The skin was reapproximated with 4-0 Monocryl and Skin glue.    Instrument, sponge, and needle counts were correct prior the abdominal closure and at the conclusion of the case.         Ashley Wong MD  00:03 EDT  7/16/2024    Electronically signed by Ashley Wong MD at 07/16/24 0008          Physician Progress Notes (last 48 hours)        Saurav Cordero MD at 07/14/24 1920          Patient Vitals for the past 24 hrs:   BP Temp Pulse SpO2   07/14/24 1910 120/78 -- -- --   07/14/24 1900 126/98 -- -- --   07/14/24 1850 124/90 -- -- --   07/14/24 1840 126/89 -- -- --   07/14/24 1831 128/94 -- -- --   07/14/24 1828 -- 98.1 °F (36.7 °C) 86 97 %   07/14/24 1827 -- -- 86 96 %   07/14/24 1826 -- -- 80 97 %   07/14/24 1824 140/95 -- -- --     Will bring to labor and delivery and observe blood pressures  Allow to eat at this point and check NSTs  Does not need continuous monitoring  Currently does not meet criteria for gestational hypertensive disease may develop that with serial blood pressures  Check CBC and CMP  May be candidate for discharge if blood pressures continue to trend down  Have reviewed with RAJI Farias M.D.  July 14, 2024  19:21 EDT          Electronically signed by Saurav Cordero MD at 07/14/24 1922

## 2024-07-16 NOTE — PROGRESS NOTES
Patient states she is feeling exhausted and is in severe pain. She has expressed desire for . Discussed options for proceeding including attempting epidural placement for comfort and rest. As she would need anesthesia for  delivery, she agrees to attempting epidural placement now in hopes of continuing towards vaginal birth. If epidural cannot be placed, will discuss options further.

## 2024-07-16 NOTE — L&D DELIVERY NOTE
Primary Low Transverse  Section Procedure Note    Ct Doe    2024     Indications: 1. IUP at 37w2d   2. IOL due to GHTN   3. Arrest of dilation   4. Severe scoliosis with inability to place regional anesthesia   5. Class 2 obesity (BMI 37)    Pre-operative Diagnosis: 37w3d    Post-operative Diagnosis: same    Findings: VMI in vertex presentation; normal appearing maternal anatomy    Birth Information  YOB: 2025   Time of birth: 11:21 PM   Delivering clinician: Ashley Wong   Sex: male   Delivery type: , Low Transverse   Breech type (if applicable):     Observed anomalies/comments:         Estimated Blood Loss:  528cc           Drains: [place]                 Specimens: placenta (not sent to pathology)               Complications:  None; patient tolerated the procedure well.           Disposition: PACU - hemodynamically stable.           Condition: stable    Surgeon: Ashley Wong MD     Assistants: thien; Derrek De Jesus MD - his assistance was required for retraction and fundal pressure and was necessary for the success of the case    Anesthesia: General endotracheal anesthesia    ASA Class: 3    Procedure Details   The patient was seen in the Holding Room. The risks, benefits, complications, treatment options, and expected outcomes were discussed with the patient.  The patient concurred with the proposed plan, giving informed consent.  The site of surgery was properly noted. The patient was taken to the Operating Room, identified as Ct Doe and the procedure verified as  Delivery. A Time Out was held and the above information confirmed.    The patient was taken to the operating room where general endotracheal anesthesia was placed and was found to be adequate. She was prepped and draped in the usual sterile fashion in the dorsal supine position with a leftward tilt. A Pfannenstiel skin incision was made with the scalpel and carried through to  the underlying layer of fascia using the scalpel. The fascia was then nicked in the midline, and the fascial incision was extended laterally in a blunt fashion. The rectus muscles were then  in the midline and the peritoneum was identified. The peritoneum was entered bluntly, and this incision was extended superiorly and inferiorly with good visualization of underlying structures.  The bladder blade was then inserted. A bladder flap was not turned down. The lower uterine segment was identified and a low transverse uterine incision was made using the scalpel. Delivered from vertex presentation was a [place] Measurements  Weight (oz): 136.16    Length (in): 19.5    Head circumference (in):      Chest circumference (in):    with apgars scores of         APGARS  One minute Five minutes Ten minutes Fifteen minutes Twenty minutes   Skin color: 0   1             Heart rate: 2   2             Grimace: 2   2              Muscle tone: 2   2              Breathin   2              Totals: 8   9              . The nose and mouth were suctioned, the cord was clamped and cut, and the infant was handed off to the awaiting Delivery Room Team. Cord blood was then obtained. The placenta was removed intact and appeared normal. The uterus was then exteriorized from the abdominal cavity and cleared of all clots and debris. The uterine outline, tubes and ovaries appeared normal. The hysterotomy was then closed using 1 Monocryl in a running, locked fashion. Hemostasis was observed.  The uterus was placed back inside the abdominal cavity, and the gutters were cleared of clots and debris. The hysterotomy was again reexamined and excellent hemostasis continued to be noted. The fascia was then reapproximated with running sutures of 0 PDS. The incision was then irrigated, and the subcutaneous tissue was reapproximated with 3-0 plain. The skin was reapproximated with 4-0 Monocryl and Skin glue.    Instrument, sponge, and needle  counts were correct prior the abdominal closure and at the conclusion of the case.         Ashley Wong MD  00:03 EDT  7/16/2024

## 2024-07-16 NOTE — LACTATION NOTE
07/16/24 1004   Maternal Information   Date of Referral 07/16/24   Person Making Referral lactation consultant  (courtesy visit with newly postpartum couplet)   Maternal Reason for Referral no prior breastfeeding experience  (infant has been given formula thus far; mother stated she would like to try to breast feed, but not today; encouraged mother we are here to support her feeding plan; to call lactation as the need arises and with any questions/concerns.)     Also inquired if mother had a pump; mother stated she did not;  offered to provide a pump through [employer], but mother stated she would call insurance company first to see what they offered; to call lactation to let us know if she needed a pump here.

## 2024-07-16 NOTE — ANESTHESIA POSTPROCEDURE EVALUATION
Patient: Ct Doe    Procedure Summary       Date: 07/15/24 Room / Location: [facility] LABOR DELIVERY   [facility] LABOR DELIVERY    Anesthesia Start:  Anesthesia Stop: 24 0004    Procedure:  SECTION PRIMARY (Abdomen) Diagnosis:     Surgeons: Ashley Wong MD Provider: Fercho Hodge DO    Anesthesia Type: general ASA Status: 3            Anesthesia Type: general    Vitals  Vitals Value Taken Time   /68 24 1220   Temp 97.7 °F (36.5 °C) 24 1220   Pulse 79 24 1220   Resp 16 24 1220   SpO2 98 % 24 0815           Post Anesthesia Care and Evaluation    Patient location during evaluation: bedside  Patient participation: complete - patient participated  Level of consciousness: awake and alert  Pain management: adequate    Airway patency: patent  Anesthetic complications: No anesthetic complications    Cardiovascular status: acceptable  Respiratory status: acceptable  Hydration status: acceptable  Post Neuraxial Block status: Motor and sensory function returned to baseline and No signs or symptoms of PDPH

## 2024-07-16 NOTE — ANESTHESIA POSTPROCEDURE EVALUATION
Patient: Ct Doe    Procedure Summary       Date: 07/15/24 Room / Location: [facility] LABOR DELIVERY   [facility]OR DELIVERY    Anesthesia Start:  Anesthesia Stop: 24    Procedure:  SECTION PRIMARY (Abdomen) Diagnosis:     Surgeons: Ashley Wong MD Provider: Fercho Hodge DO    Anesthesia Type: general ASA Status: 3            Anesthesia Type: general    Vitals  Vitals Value Taken Time   /76 24 0000   Temp 98.7 F    Pulse 86 24   Resp 20    SpO2 96 % 24   Vitals shown include unfiled device data.        Post Anesthesia Care and Evaluation    Patient location during evaluation: bedside  Patient participation: complete - patient participated  Level of consciousness: awake  Pain score: 5  Pain management: satisfactory to patient    Airway patency: patent  Anesthetic complications: No anesthetic complications  PONV Status: none  Cardiovascular status: acceptable and hemodynamically stable  Respiratory status: acceptable  Hydration status: acceptable

## 2024-07-16 NOTE — PROGRESS NOTES
2024    Name:Ct Doe    MR#:[id number]     PROGRESS NOTE:  Post-Op 1 S/P        Subjective   18 y.o. yo Female  s/p CS at 37w2d doing well. Pain well controlled, lochia appropriate      Elevated blood pressure affecting pregnancy in third trimester, antepartum    S/P primary low transverse         Objective    Vitals  Temp:  Temp:  [97.7 °F (36.5 °C)-99.3 °F (37.4 °C)] 98.1 °F (36.7 °C)  Temp src: Oral  BP:  BP: (107-139)/(59-86) 136/86  Pulse:  Heart Rate:  [64-94] 91  RR:   Resp:  [16-18] 18    General Awake, alert, no distress  Abdomen Soft, nondistended, fundus firm, below umbilicus, appropriately tender  Incision  Intact, no erythema or exudate  Extremities Calves NT bilaterally     I/O last 3 completed shifts:  In: 800 [I.V.:800]  Out: 828 [Urine:300; Blood:528]    Lab Results   Component Value Date    WBC 9.55 2024    HGB 12.4 2024    HCT 37.4 2024    MCV 82.2 2024     2024    CREATININE 0.65 2024    AST 13 2024    ALT 6 2024    HEPBSAG Non-Reactive 2024     Results from last 7 days   Lab Units 24   ABO TYPING  O   RH TYPING  Positive   ANTIBODY SCREEN  Negative       Infant: male       Assessment   1.  POD 1 from  Section    Plan: Doing well.    advance diet, remove galdamez catheter, may shower.          Bernie Higgins CNM  2024 10:39 EDT

## 2024-07-17 LAB
BASOPHILS # BLD AUTO: 0.03 10*3/MM3 (ref 0–0.2)
BASOPHILS NFR BLD AUTO: 0.3 % (ref 0–1.5)
DEPRECATED RDW RBC AUTO: 40.3 FL (ref 37–54)
EOSINOPHIL # BLD AUTO: 0.1 10*3/MM3 (ref 0–0.4)
EOSINOPHIL NFR BLD AUTO: 1.1 % (ref 0.3–6.2)
ERYTHROCYTE [DISTWIDTH] IN BLOOD BY AUTOMATED COUNT: 13 % (ref 12.3–15.4)
HCT VFR BLD AUTO: 30.3 % (ref 34–46.6)
HGB BLD-MCNC: 9.6 G/DL (ref 12–15.9)
IMM GRANULOCYTES # BLD AUTO: 0.04 10*3/MM3 (ref 0–0.05)
IMM GRANULOCYTES NFR BLD AUTO: 0.5 % (ref 0–0.5)
LYMPHOCYTES # BLD AUTO: 2.46 10*3/MM3 (ref 0.7–3.1)
LYMPHOCYTES NFR BLD AUTO: 27.8 % (ref 19.6–45.3)
MCH RBC QN AUTO: 27.4 PG (ref 26.6–33)
MCHC RBC AUTO-ENTMCNC: 31.7 G/DL (ref 31.5–35.7)
MCV RBC AUTO: 86.3 FL (ref 79–97)
MONOCYTES # BLD AUTO: 0.86 10*3/MM3 (ref 0.1–0.9)
MONOCYTES NFR BLD AUTO: 9.7 % (ref 5–12)
NEUTROPHILS NFR BLD AUTO: 5.37 10*3/MM3 (ref 1.7–7)
NEUTROPHILS NFR BLD AUTO: 60.6 % (ref 42.7–76)
NRBC BLD AUTO-RTO: 0.2 /100 WBC (ref 0–0.2)
PLATELET # BLD AUTO: 165 10*3/MM3 (ref 140–450)
PMV BLD AUTO: 11.7 FL (ref 6–12)
QT INTERVAL: 396 MS
QTC INTERVAL: 430 MS
RBC # BLD AUTO: 3.51 10*6/MM3 (ref 3.77–5.28)
WBC NRBC COR # BLD AUTO: 8.86 10*3/MM3 (ref 3.4–10.8)

## 2024-07-17 PROCEDURE — 85025 COMPLETE CBC W/AUTO DIFF WBC: CPT | Performed by: OBSTETRICS & GYNECOLOGY

## 2024-07-17 PROCEDURE — 25010000002 KETOROLAC TROMETHAMINE PER 15 MG: Performed by: OBSTETRICS & GYNECOLOGY

## 2024-07-17 RX ORDER — FERROUS SULFATE 325(65) MG
325 TABLET ORAL
Status: DISCONTINUED | OUTPATIENT
Start: 2024-07-17 | End: 2024-07-18 | Stop reason: HOSPADM

## 2024-07-17 RX ADMIN — FERROUS SULFATE TAB 325 MG (65 MG ELEMENTAL FE) 325 MG: 325 (65 FE) TAB at 12:31

## 2024-07-17 RX ADMIN — IBUPROFEN 600 MG: 600 TABLET ORAL at 19:37

## 2024-07-17 RX ADMIN — ACETAMINOPHEN 650 MG: 325 TABLET, FILM COATED ORAL at 12:31

## 2024-07-17 RX ADMIN — ACETAMINOPHEN 650 MG: 325 TABLET, FILM COATED ORAL at 18:02

## 2024-07-17 RX ADMIN — ACETAMINOPHEN 650 MG: 325 TABLET, FILM COATED ORAL at 05:25

## 2024-07-17 RX ADMIN — KETOROLAC TROMETHAMINE 15 MG: 15 INJECTION, SOLUTION INTRAMUSCULAR; INTRAVENOUS at 02:05

## 2024-07-17 RX ADMIN — ACETAMINOPHEN 650 MG: 325 TABLET, FILM COATED ORAL at 22:22

## 2024-07-17 RX ADMIN — IBUPROFEN 600 MG: 600 TABLET ORAL at 08:27

## 2024-07-17 RX ADMIN — IBUPROFEN 600 MG: 600 TABLET ORAL at 14:31

## 2024-07-17 NOTE — PROGRESS NOTES
2024    Name:Ct Doe    MR#:[id number]     PROGRESS NOTE:  Post-Op 2 S/P        Subjective   18 y.o. yo Female  s/p CS at 37w2d doing well. Pain well controlled, lochia appropriate, tolerating diet.       Elevated blood pressure affecting pregnancy in third trimester, antepartum    S/P primary low transverse         Objective    Vitals  Temp:  Temp:  [97.7 °F (36.5 °C)-98.6 °F (37 °C)] 98.6 °F (37 °C)  Temp src: Oral  BP:  BP: (108-129)/(52-81) 119/69  Pulse:  Heart Rate:  [71-88] 75  RR:   Resp:  [16-18] 16    General Awake, alert, no distress  Abdomen Soft, non-distended, fundus firm, below umbilicus, appropriately tender  Incision  Intact, no erythema or exudate  Extremities Calves NT bilaterally     I/O last 3 completed shifts:  In: 800 [I.V.:800]  Out: 1753 [Urine:1225; Blood:528]    Lab Results   Component Value Date    WBC 8.86 2024    HGB 9.6 (L) 2024    HCT 30.3 (L) 2024    MCV 86.3 2024     2024    CREATININE 0.65 2024    AST 13 2024    ALT 6 2024    HEPBSAG Non-Reactive 2024     Results from last 7 days   Lab Units 24   ABO TYPING  O   RH TYPING  Positive   ANTIBODY SCREEN  Negative       Infant: male       Assessment   1.  POD 2 from  Section  2. GHTN: stable Bps and labs  3. Anemia 2/2 acute blood loss     Plan: Doing well.    Ferrous sulfate daily.   Consideration for d/c as clinically indicated.           Bernie Higgins CNM  2024 08:54 EDT

## 2024-07-18 VITALS
OXYGEN SATURATION: 98 % | RESPIRATION RATE: 18 BRPM | SYSTOLIC BLOOD PRESSURE: 104 MMHG | BODY MASS INDEX: 37.64 KG/M2 | WEIGHT: 220.46 LBS | DIASTOLIC BLOOD PRESSURE: 56 MMHG | HEART RATE: 69 BPM | HEIGHT: 64 IN | TEMPERATURE: 98.3 F

## 2024-07-18 RX ORDER — PSEUDOEPHEDRINE HCL 30 MG
100 TABLET ORAL 2 TIMES DAILY PRN
Qty: 60 CAPSULE | Refills: 0 | Status: SHIPPED | OUTPATIENT
Start: 2024-07-18

## 2024-07-18 RX ORDER — IBUPROFEN 600 MG/1
600 TABLET ORAL EVERY 6 HOURS
Qty: 60 TABLET | Refills: 0 | Status: SHIPPED | OUTPATIENT
Start: 2024-07-18

## 2024-07-18 RX ORDER — FERROUS SULFATE 325(65) MG
325 TABLET ORAL
Qty: 30 TABLET | Refills: 0 | Status: SHIPPED | OUTPATIENT
Start: 2024-07-19

## 2024-07-18 RX ADMIN — ACETAMINOPHEN 650 MG: 325 TABLET, FILM COATED ORAL at 10:52

## 2024-07-18 RX ADMIN — PRENATAL VITAMINS-IRON FUMARATE 27 MG IRON-FOLIC ACID 0.8 MG TABLET 1 TABLET: at 07:57

## 2024-07-18 RX ADMIN — IBUPROFEN 600 MG: 600 TABLET ORAL at 02:30

## 2024-07-18 RX ADMIN — FERROUS SULFATE TAB 325 MG (65 MG ELEMENTAL FE) 325 MG: 325 (65 FE) TAB at 07:58

## 2024-07-18 RX ADMIN — ACETAMINOPHEN 650 MG: 325 TABLET, FILM COATED ORAL at 05:35

## 2024-07-18 RX ADMIN — IBUPROFEN 600 MG: 600 TABLET ORAL at 07:58

## 2024-07-18 NOTE — DISCHARGE SUMMARY
[facility]   Discharge Summary      Patient: Ct Doe      MR#:[id number]  Admission  Diagnosis: Elevated blood pressure affecting pregnancy in third trimester, antepartum  Discharge Diagnosis: s/p  section    Date of Admission: 2024  Date of Discharge:   24    Procedures:  , Low Transverse     7/15/2024    11:21 PM      Service:  Obstetrics    Hospital Course:  Patient admitted for G HTN and began induction of labor.  During induction noted to have low O2 saturation.  She had echocardiogram, EKG, chest x-ray and cardiology was consulted.  They were agreeable to proceed with induction.  Due to scoliosis, she was unable to receive epidural placement she opted for elective  section.  She remained in the hospital for 4 days.  During that time she remained afebrile and hemodynamically stable.  Postpartum her blood pressures remained very well-controlled, asymptomatic.  On the day of discharge, she was eating, ambulating and voiding without difficulty.        Labs:    Lab Results (last 24 hours)       ** No results found for the last 24 hours. **            Discharge Medications     Discharge Medications        New Medications        Instructions Start Date   docusate sodium 100 MG capsule   100 mg, Oral, 2 Times Daily PRN      ferrous sulfate 325 (65 FE) MG tablet   325 mg, Oral, Daily With Breakfast   Start Date: 2024     ibuprofen 600 MG tablet  Commonly known as: ADVIL,MOTRIN   600 mg, Oral, Every 6 Hours             Continue These Medications        Instructions Start Date   calcium carbonate 500 MG chewable tablet  Commonly known as: TUMS   1 tablet, Oral, Daily           Reports no narcotic use during postop period, declines pain or need for additional meds for discharge,     Discharge Disposition:  To Home    Discharge Condition:  Stable    Discharge Diet: regular    Activity at Discharge: pelvic rest    Follow-up Appointments  1 weeks for BP  check      Bernie Higgins CNM  07/18/24  08:31 EDT

## 2024-07-18 NOTE — PLAN OF CARE
Problem: Adult Inpatient Plan of Care  Goal: Plan of Care Review  Outcome: Met  Goal: Patient-Specific Goal (Individualized)  Outcome: Met  Goal: Absence of Hospital-Acquired Illness or Injury  Outcome: Met  Intervention: Identify and Manage Fall Risk  Recent Flowsheet Documentation  Taken 2024 1020 by Noemí Cohen RN  Safety Promotion/Fall Prevention: safety round/check completed  Taken 2024 0758 by Noemí Cohen RN  Safety Promotion/Fall Prevention: safety round/check completed  Taken 2024 0714 by Noemí Cohen RN  Safety Promotion/Fall Prevention: safety round/check completed  Intervention: Prevent and Manage VTE (Venous Thromboembolism) Risk  Recent Flowsheet Documentation  Taken 2024 0758 by Noemí Cohen RN  Activity Management: up ad georgie  Goal: Optimal Comfort and Wellbeing  Outcome: Met  Intervention: Monitor Pain and Promote Comfort  Recent Flowsheet Documentation  Taken 2024 0758 by Noemí Cohen RN  Pain Management Interventions:   around-the-clock dosing utilized   pain management plan reviewed with patient/caregiver   see MAR  Goal: Readiness for Transition of Care  Outcome: Met     Problem: Skin Injury Risk Increased  Goal: Skin Health and Integrity  Outcome: Met     Problem: Adjustment to Role Transition (Postpartum  Delivery)  Goal: Successful Maternal Role Transition  Outcome: Met     Problem: Bleeding (Postpartum  Delivery)  Goal: Hemostasis  Outcome: Met     Problem: Infection (Postpartum  Delivery)  Goal: Absence of Infection Signs and Symptoms  Outcome: Met     Problem: Pain (Postpartum  Delivery)  Goal: Acceptable Pain Control  Outcome: Met  Intervention: Prevent or Manage Pain  Recent Flowsheet Documentation  Taken 2024 0758 by Noemí Cohen RN  Pain Management Interventions:   around-the-clock dosing utilized   pain management plan reviewed with patient/caregiver   see MAR     Problem: Postoperative Nausea and Vomiting  (Postpartum  Delivery)  Goal: Nausea and Vomiting Relief  Outcome: Met     Problem: Postoperative Urinary Retention (Postpartum  Delivery)  Goal: Effective Urinary Elimination  Outcome: Met   Goal Outcome Evaluation:

## 2024-07-18 NOTE — PROGRESS NOTES
2024    Name:Ct Doe    MR#:[id number]     PROGRESS NOTE:  Post-Op 3 S/P        Subjective   18 y.o. yo Female  s/p CS at 37w2d doing well. Pain well controlled, lochia appropriate, tolerating diet. Declines pain      Elevated blood pressure affecting pregnancy in third trimester, antepartum    S/P primary low transverse         Objective    Vitals  Temp:  Temp:  [98 °F (36.7 °C)-98.4 °F (36.9 °C)] 98.3 °F (36.8 °C)  Temp src: Oral  BP:  BP: (104-134)/(56-85) 104/56  Pulse:  Heart Rate:  [69-88] 69  RR:   Resp:  [16-18] 18    General Awake, alert, no distress  Abdomen Soft, non-distended, fundus firm, below umbilicus, appropriately tender  Incision  Intact, no erythema or exudate  Extremities Calves NT bilaterally     I/O last 3 completed shifts:  In: -   Out: 1300 [Urine:1300]    Lab Results   Component Value Date    WBC 8.86 2024    HGB 9.6 (L) 2024    HCT 30.3 (L) 2024    MCV 86.3 2024     2024    CREATININE 0.65 2024    AST 13 2024    ALT 6 2024    HEPBSAG Non-Reactive 2024     Results from last 7 days   Lab Units 24   ABO TYPING  O   RH TYPING  Positive   ANTIBODY SCREEN  Negative       Infant: male       Assessment   1.  POD 3 from  Section  2. GHTN: stable Bps and labs  3. Anemia 2/2 acute blood loss     Plan: Doing well.    Cont iron at discharge  Consideration for d/c as clinically indicated.   D/C instructions given, precautions reviewed.        Bernie Higgins CNM  2024 08:29 EDT

## 2025-07-16 ENCOUNTER — TELEPHONE (OUTPATIENT)
Dept: OBSTETRICS AND GYNECOLOGY | Facility: CLINIC | Age: 20
End: 2025-07-16
Payer: COMMERCIAL

## 2025-07-16 NOTE — TELEPHONE ENCOUNTER
Pt does not have regular periods and often will skip months at a time. Her last known period was in April. She started have nausea and took a pregnancy test which was positive. Will bring in for US and NOB. She denies any current problems.

## 2025-07-16 NOTE — TELEPHONE ENCOUNTER
PATIENT HAS NOT SEEN AISLINN LEOS  IN THE PAST. INCOMING REFERRAL FROM SELF FOR DX: PREGNANCY.  UNSURE OF LMP, THINKS IT WAS APRIL. DOESN'T HAVE REGULAR PERIODS. STARTED HAVING NAUSEA SO DECIDED TO TEST AND POSITIVE HOME PREG TEST.     HUB SENDING TO SCHEDULE REVIEW FOR SCHEDULING.    PLEASE ADVISE SINCE LMP IS UNKNOWN

## 2025-07-23 ENCOUNTER — INITIAL PRENATAL (OUTPATIENT)
Dept: OBSTETRICS AND GYNECOLOGY | Facility: CLINIC | Age: 20
End: 2025-07-23
Payer: COMMERCIAL

## 2025-07-23 VITALS — DIASTOLIC BLOOD PRESSURE: 64 MMHG | WEIGHT: 148 LBS | BODY MASS INDEX: 25.39 KG/M2 | SYSTOLIC BLOOD PRESSURE: 110 MMHG

## 2025-07-23 DIAGNOSIS — M41.9 SCOLIOSIS, UNSPECIFIED SCOLIOSIS TYPE, UNSPECIFIED SPINAL REGION: ICD-10-CM

## 2025-07-23 DIAGNOSIS — Z3A.13 13 WEEKS GESTATION OF PREGNANCY: Primary | ICD-10-CM

## 2025-07-23 DIAGNOSIS — Z98.890 HISTORY OF LEFT HEART CATHETERIZATION: ICD-10-CM

## 2025-07-23 DIAGNOSIS — Z87.59 HISTORY OF GESTATIONAL HYPERTENSION: ICD-10-CM

## 2025-07-23 DIAGNOSIS — Z98.891 HISTORY OF LOW TRANSVERSE CESAREAN SECTION: ICD-10-CM

## 2025-07-23 DIAGNOSIS — N92.6 IRREGULAR MENSTRUATION: ICD-10-CM

## 2025-07-23 DIAGNOSIS — Z91.89 HX OF DRUG OVERDOSE: ICD-10-CM

## 2025-07-23 PROBLEM — I07.1 TRICUSPID REGURGITATION: Status: ACTIVE | Noted: 2025-07-23

## 2025-07-23 PROBLEM — O16.3 ELEVATED BLOOD PRESSURE AFFECTING PREGNANCY IN THIRD TRIMESTER, ANTEPARTUM: Status: RESOLVED | Noted: 2024-07-14 | Resolved: 2025-07-23

## 2025-07-23 PROBLEM — I21.3 STEMI (ST ELEVATION MYOCARDIAL INFARCTION): Status: ACTIVE | Noted: 2023-07-30

## 2025-07-23 PROBLEM — I34.0 MITRAL REGURGITATION: Status: ACTIVE | Noted: 2025-07-23

## 2025-07-23 RX ORDER — PRENATAL WITH FERROUS FUM AND FOLIC ACID 3080; 920; 120; 400; 22; 1.84; 3; 20; 10; 1; 12; 200; 27; 25; 2 [IU]/1; [IU]/1; MG/1; [IU]/1; MG/1; MG/1; MG/1; MG/1; MG/1; MG/1; UG/1; MG/1; MG/1; MG/1; MG/1
1 TABLET ORAL DAILY
Qty: 90 TABLET | Refills: 1 | Status: SHIPPED | OUTPATIENT
Start: 2025-07-23

## 2025-07-23 NOTE — PROGRESS NOTES
Initial ob visit     CC- Here for care of pregnancy        Ct Doe is a 19 y.o. female, , who presents for her first obstetrical visit.  Patient's last menstrual period was 04/15/2025.. Her EMILY is 2027, by Ultrasound. Current GA is 13w4d.     Initial positive test date : 2025, UPT        Her periods are every very irregular. Reports missing monthly cycles sometimes. Last known menstrual cycle was approx 4/15/25.   Prior obstetric issues: gestational HTN  Patient's past medical history is significant for: Hx of STEMI, Drug overdose  Family history of genetic issues (includes FOB): none  Prior infections concerning in pregnancy (Rash, fever in last 2 weeks): No  Varicella Hx - has never had the infection nor the vaccine  Prior testing for Cystic Fibrosis Carrier or Sickle Cell Trait- none  Prepregnancy BMI - Body mass index is 25.39 kg/m².  History of STD: yes GC/CHLAMYDIA-treated   Hx of HSV for patient or partner: no  Ultrasound Today: yes.  bpm, CRL 75mm, 13w4d, AF nml, placenta anterior. I have reviewed the preliminary US report. Final report pending physician review. Ni Reyes, APRN      OB History    Para Term  AB Living   4 1 1 0 2 1   SAB IAB Ectopic Molar Multiple Live Births   0 2 0 0 0 1      # Outcome Date GA Lbr Ganga/2nd Weight Sex Type Anes PTL Lv   4 Current            3 Term 07/15/24 37w2d  3860 g (8 lb 8.2 oz) M CS-LTranv Gen N MARCIA   2 IAB            1 IAB                Additional Pertinent History   Last Pap : n/a      Last Completed Pap Smear    This patient has no relevant Health Maintenance data.       History of abnormal Pap smear: n/a  Family history of uterine, colon, breast, or ovarian cancer: no  Feelings of Anxiety or Depression: no  Tobacco Usage?: No   Alcohol/Drug Use?: NO  Over the age of 35 at delivery: no  Genetic Screening: desires cell free DNA  Flu Status: Declines    PMH    Current Outpatient Medications:      Prenatal Vit-Fe Fumarate-FA (Prenatal 27-) 27-1 MG tablet tablet, Take 1 tablet by mouth Daily., Disp: 90 tablet, Rfl: 1     Past Medical History:   Diagnosis Date    History of gestational hypertension     History of left heart catheterization 2023    Scoliosis     severe; xray 2021: Right thoracic curve 80 degrees, and left thoracolumbar curve of 80 degrees    STEMI (ST elevation myocardial infarction) 2023        Past Surgical History:   Procedure Laterality Date     SECTION N/A 7/15/2024    Procedure:  SECTION PRIMARY;  Surgeon: Ashley Wong MD;  Location: [facility] LABOR DELIVERY;  Service: Obstetrics;  Laterality: N/A;       Review of Systems   Review of Systems   Respiratory: Negative.  Negative for shortness of breath.    Cardiovascular: Negative.  Negative for chest pain.   Gastrointestinal: Negative.  Negative for abdominal distention, abdominal pain, constipation, nausea and vomiting.   Genitourinary:  Positive for breast pain (Breast tenderness- preg related), dyspareunia (cramping with deep penetration. Pain improved with changing positions.) and menstrual problem (irregular). Negative for breast discharge, breast lump, dysuria, pelvic pain, pelvic pressure, urinary incontinence, vaginal bleeding, vaginal discharge and vaginal pain.   Psychiatric/Behavioral: Negative.  Negative for depressed mood. The patient is not nervous/anxious.        Patient Reports: nausea x1 which lead her to pregnancy test. No nausea since then.   Patient Denies:excessive vomiting and vaginal bleeding  All systems reviewed and otherwise normal.    I have reviewed and agree with the HPI, ROS, and historical information as entered above. Ni Reyes, APRN      /64   Wt 67.1 kg (148 lb)   LMP 04/15/2025   BMI 25.39 kg/m²     The additional following portions of the patient's history were reviewed and updated as appropriate: allergies, current medications, past family history, past  "medical history, past social history, and past surgical history.    Physical Exam  General:  well developed; well nourished  no acute distress  mentation appropriate   Chest/Respiratory: No labored breathing, normal respiratory effort, normal appearance, no respiratory noises noted   Heart:  normal rate, regular rhythm,  no murmurs, rubs, or gallops   Thyroid: normal to inspection and palpation   Breasts:  Not performed.   Abdomen: soft, non-tender; no masses  no umbilical or inguinal hernias are present  no hepato-splenomegaly   Pelvis: Not performed.        Assessment and Plan    Problem List Items Addressed This Visit       History of gestational hypertension    Overview   Induced @ 37w1d due to GHTN.  PEP pending.          Relevant Orders    AlP+ALT+AST+Creat+LD+TBili+..    History of left heart catheterization    Overview   23-  Hospital   Procedures:   1. Coronary Angiography   2. Left Heart Catheterization   Indications:   1. STEMI   Impressions:   1. Right dominant coronary circulation with no angiographic evidence of   obstructive CAD. Absent left main with separate ostia of LAD and LCx.   2. Severe peripheral vasospasm necessitated switching from radial to   transfemoral access.   3. Moderately elevated LVEDP.          History of low transverse  section    Overview   For arrest in dilation  OP note states anesthesia was unable to provide general anesthesia d/t \"severe\" scoliosis         History of ST elevation myocardial infarction (STEMI)    Overview   Evaluated by Mirza Yoon MD, Cardiology 7/15/2024 prior to IOL->PCS  Cardiac risk evaluation prior to labor induction  Cardiac catheterization reviewed from 2023.  No obstructive coronary artery disease at that time.  Echocardiogram today showing a normal ejection fraction with mild mitral valve prolapse and trace mitral valve regurgitation.  EKG normal.  Patient's heart rate and blood pressure are currently within the normal " range.  I believe she can proceed with labor induction at low cardiac risk.  No special precautions needed at this time.         Hx of drug overdose    Overview   7/30/25- ED visit @ [facility]. Found unresponsive. Patient reported getting xanax off street that must've been laced.   UDS + Benzos, Cannabinoid, and Fentanyl            Scoliosis     Other Visit Diagnoses         13 weeks gestation of pregnancy    -  Primary    Relevant Medications    Prenatal Vit-Fe Fumarate-FA (Prenatal 27-1) 27-1 MG tablet tablet    Other Relevant Orders    Obstetric Panel    HIV-1 / O / 2 Ag / Antibody    Urine Culture - Urine, Urine, Clean Catch    Urinalysis With Microscopic - Urine, Clean Catch    Chlamydia trachomatis, Neisseria gonorrhoeae, PCR - Urine, Urine, Clean Catch    Urine Drug Screen - Urine, Clean Catch    [id number] PLUS Core+SCA+ESS - Blood,    AlP+ALT+AST+Creat+LD+TBili+..    US Ob < 14 Weeks Single or First Gestation      Irregular menstruation        Relevant Orders    US Ob < 14 Weeks Single or First Gestation            Pregnancy at 13w4d  Reviewed routine prenatal care with the office and educational materials given  Discussed options for genetic testing including first trimester nuchal translucency screen, genetic disease carrier testing, quadruple screen, and NIPT  Discontinue the use of all non-medicinal drugs and chemicals  Nausea/Vomiting - she does not desire medications at this time.  Discussed conservative ways to help with nausea.  Start prenatal vitamins- Rx sent to pharmacy  Activity recommendation : 150 minutes/week of moderate intensity aerobic activity unless we limit for bleeding, hypertension or other pregnancy complication   U/S ordered at follow up- confirm dates  Recommend limiting weight gain to 15 to 20 pounds in pregnancy.   Discussed carbohydrate control.   baseline PEP today- Hx GHTN  Return in about 2 weeks (around 8/6/2025) for RUFINO saez/ next available female MD, Ultrasound- confirm  dates.      Ni Reyes, APRN  07/23/2025

## 2025-07-24 LAB
ABO GROUP BLD: NORMAL
ALP SERPL-CCNC: 54 IU/L (ref 42–106)
ALT SERPL-CCNC: 8 IU/L (ref 0–32)
AMPHETAMINES UR QL SCN: NEGATIVE NG/ML
APPEARANCE UR: ABNORMAL
AST SERPL-CCNC: 11 IU/L (ref 0–40)
BACTERIA #/AREA URNS HPF: NORMAL /[HPF]
BARBITURATES UR QL SCN: NEGATIVE NG/ML
BASOPHILS # BLD AUTO: 0 X10E3/UL (ref 0–0.2)
BASOPHILS NFR BLD AUTO: 0 %
BENZODIAZ UR QL SCN: NEGATIVE NG/ML
BILIRUB SERPL-MCNC: 0.3 MG/DL (ref 0–1.2)
BILIRUB UR QL STRIP: NEGATIVE
BLD GP AB SCN SERPL QL: NEGATIVE
BZE UR QL SCN: NEGATIVE NG/ML
C TRACH RRNA SPEC QL NAA+PROBE: NEGATIVE
CANNABINOIDS UR QL SCN: POSITIVE NG/ML
CASTS URNS QL MICRO: NORMAL /LPF
COLOR UR: YELLOW
CREAT SERPL-MCNC: 0.6 MG/DL (ref 0.57–1)
CREAT UR-MCNC: 131.1 MG/DL (ref 20–300)
EGFRCR SERPLBLD CKD-EPI 2021: 132 ML/MIN/1.73
EOSINOPHIL # BLD AUTO: 0 X10E3/UL (ref 0–0.4)
EOSINOPHIL NFR BLD AUTO: 1 %
EPI CELLS #/AREA URNS HPF: NORMAL /HPF (ref 0–10)
ERYTHROCYTE [DISTWIDTH] IN BLOOD BY AUTOMATED COUNT: 13.3 % (ref 11.7–15.4)
GLUCOSE UR QL STRIP: NEGATIVE
HBV SURFACE AG SERPL QL IA: NEGATIVE
HCT VFR BLD AUTO: 46.6 % (ref 34–46.6)
HCV IGG SERPL QL IA: NON REACTIVE
HGB BLD-MCNC: 14.8 G/DL (ref 11.1–15.9)
HGB UR QL STRIP: NEGATIVE
HIV 1+2 AB+HIV1 P24 AG SERPL QL IA: NON REACTIVE
IMM GRANULOCYTES # BLD AUTO: 0 X10E3/UL (ref 0–0.1)
IMM GRANULOCYTES NFR BLD AUTO: 0 %
KETONES UR QL STRIP: NEGATIVE
LABORATORY COMMENT REPORT: ABNORMAL
LDH SERPL L TO P-CCNC: 137 IU/L (ref 119–226)
LEUKOCYTE ESTERASE UR QL STRIP: NEGATIVE
LYMPHOCYTES # BLD AUTO: 2.7 X10E3/UL (ref 0.7–3.1)
LYMPHOCYTES NFR BLD AUTO: 35 %
MCH RBC QN AUTO: 28.2 PG (ref 26.6–33)
MCHC RBC AUTO-ENTMCNC: 31.8 G/DL (ref 31.5–35.7)
MCV RBC AUTO: 89 FL (ref 79–97)
METHADONE UR QL SCN: NEGATIVE NG/ML
MICRO URNS: ABNORMAL
MICRO URNS: ABNORMAL
MONOCYTES # BLD AUTO: 0.5 X10E3/UL (ref 0.1–0.9)
MONOCYTES NFR BLD AUTO: 6 %
N GONORRHOEA RRNA SPEC QL NAA+PROBE: NEGATIVE
NEUTROPHILS # BLD AUTO: 4.5 X10E3/UL (ref 1.4–7)
NEUTROPHILS NFR BLD AUTO: 58 %
NITRITE UR QL STRIP: NEGATIVE
OPIATES UR QL SCN: NEGATIVE NG/ML
OXYCODONE+OXYMORPHONE UR QL SCN: NEGATIVE NG/ML
PCP UR QL: NEGATIVE NG/ML
PH UR STRIP: 7 [PH] (ref 5–7.5)
PH UR: 6 [PH] (ref 4.5–8.9)
PLATELET # BLD AUTO: 258 X10E3/UL (ref 150–450)
PROPOXYPH UR QL SCN: NEGATIVE NG/ML
PROT UR QL STRIP: NEGATIVE
RBC # BLD AUTO: 5.24 X10E6/UL (ref 3.77–5.28)
RBC #/AREA URNS HPF: NORMAL /HPF (ref 0–2)
RH BLD: POSITIVE
RPR SER QL: NON REACTIVE
RUBV IGG SERPL IA-ACNC: 1 INDEX
SP GR UR STRIP: 1.02 (ref 1–1.03)
URATE SERPL-MCNC: 3.4 MG/DL (ref 2.6–6.2)
UROBILINOGEN UR STRIP-MCNC: 0.2 MG/DL (ref 0.2–1)
WBC # BLD AUTO: 7.7 X10E3/UL (ref 3.4–10.8)
WBC #/AREA URNS HPF: NORMAL /HPF (ref 0–5)

## 2025-07-25 PROBLEM — F12.10 MILD TETRAHYDROCANNABINOL (THC) ABUSE: Status: ACTIVE | Noted: 2025-07-25

## 2025-07-26 LAB
BACTERIA UR CULT: NORMAL
BACTERIA UR CULT: NORMAL

## 2025-07-27 LAB
5P15 DELETION (CRI-DU-CHAT): NOT DETECTED
CFDNA.FET/CFDNA.TOTAL SFR FETUS: NORMAL %
CITATION REF LAB TEST: NORMAL
FET 13+18+21+X+Y ANEUP PLAS.CFDNA: NEGATIVE
FET 1P36 DEL RISK WBC.DNA+CFDNA QL: NOT DETECTED
FET 22Q11.2 DEL RISK WBC.DNA+CFDNA QL: NOT DETECTED
FET CHR 11Q23 DEL PLAS.CFDNA QL: NOT DETECTED
FET CHR 15Q11 DEL PLAS.CFDNA QL: NOT DETECTED
FET CHR 21 TS PLAS.CFDNA QL: NEGATIVE
FET CHR 4P16 DEL PLAS.CFDNA QL: NOT DETECTED
FET CHR 8Q24 DEL PLAS.CFDNA QL: NOT DETECTED
FET MS X RISK WBC.DNA+CFDNA QL: NOT DETECTED
FET SEX PLAS.CFDNA DOSAGE CFDNA: NORMAL
FET TS 13 RISK PLAS.CFDNA QL: NEGATIVE
FET TS 18 RISK WBC.DNA+CFDNA QL: NEGATIVE
FET X + Y ANEUP RISK PLAS.CFDNA SEQ-IMP: NOT DETECTED
GA EST FROM CONCEPTION DATE: NORMAL D
GESTATIONAL AGE > 9:: YES
LAB DIRECTOR NAME PROVIDER: NORMAL
LAB DIRECTOR NAME PROVIDER: NORMAL
LABORATORY COMMENT REPORT: NORMAL
LIMITATIONS OF THE TEST: NORMAL
NEGATIVE PREDICTIVE VALUE: NORMAL
PERFORMANCE CHARACTERISTICS: NORMAL
POSITIVE PREDICTIVE VALUE: NORMAL
REF LAB TEST METHOD: NORMAL
SERVICE CMNT-IMP: NORMAL
TEST PERFORMANCE INFO SPEC: NORMAL
TRIOSOMY 16: NOT DETECTED
TRISOMY 22: NOT DETECTED

## 2025-08-07 ENCOUNTER — ROUTINE PRENATAL (OUTPATIENT)
Dept: OBSTETRICS AND GYNECOLOGY | Facility: CLINIC | Age: 20
End: 2025-08-07
Payer: COMMERCIAL

## 2025-08-07 VITALS — WEIGHT: 154.6 LBS | SYSTOLIC BLOOD PRESSURE: 120 MMHG | BODY MASS INDEX: 26.52 KG/M2 | DIASTOLIC BLOOD PRESSURE: 68 MMHG

## 2025-08-07 DIAGNOSIS — Z91.89 HX OF DRUG OVERDOSE: ICD-10-CM

## 2025-08-07 DIAGNOSIS — I25.2 HISTORY OF ST ELEVATION MYOCARDIAL INFARCTION (STEMI): ICD-10-CM

## 2025-08-07 DIAGNOSIS — Z34.93 PRENATAL CARE IN THIRD TRIMESTER, UNSPECIFIED GRAVIDITY: ICD-10-CM

## 2025-08-07 DIAGNOSIS — Z98.891 HISTORY OF LOW TRANSVERSE CESAREAN SECTION: ICD-10-CM

## 2025-08-07 DIAGNOSIS — O09.892 SHORT INTERVAL BETWEEN PREGNANCIES AFFECTING PREGNANCY IN SECOND TRIMESTER, ANTEPARTUM: ICD-10-CM

## 2025-08-07 DIAGNOSIS — Z34.90 PRENATAL CARE, ANTEPARTUM, UNSPECIFIED GRAVIDITY: Primary | ICD-10-CM

## 2025-08-07 DIAGNOSIS — M41.9 SCOLIOSIS, UNSPECIFIED SCOLIOSIS TYPE, UNSPECIFIED SPINAL REGION: ICD-10-CM

## 2025-08-07 DIAGNOSIS — Z87.59 HISTORY OF GESTATIONAL HYPERTENSION: ICD-10-CM

## 2025-08-07 DIAGNOSIS — Z34.92 PRENATAL CARE IN SECOND TRIMESTER, UNSPECIFIED GRAVIDITY: ICD-10-CM

## 2025-08-07 DIAGNOSIS — Z3A.15 15 WEEKS GESTATION OF PREGNANCY: ICD-10-CM

## 2025-08-07 LAB
GLUCOSE UR STRIP-MCNC: NEGATIVE MG/DL
PROT UR STRIP-MCNC: NEGATIVE MG/DL

## 2025-08-07 RX ORDER — PNV NO.95/FERROUS FUM/FOLIC AC 28MG-0.8MG
1 TABLET ORAL DAILY
COMMUNITY
Start: 2025-08-05

## 2025-08-13 ENCOUNTER — REFERRAL TRIAGE (OUTPATIENT)
Dept: LABOR AND DELIVERY | Facility: HOSPITAL | Age: 20
End: 2025-08-13
Payer: COMMERCIAL

## 2025-08-20 ENCOUNTER — PATIENT OUTREACH (OUTPATIENT)
Dept: LABOR AND DELIVERY | Facility: HOSPITAL | Age: 20
End: 2025-08-20
Payer: COMMERCIAL

## 2025-08-27 ENCOUNTER — OFFICE VISIT (OUTPATIENT)
Dept: CARDIOLOGY | Facility: CLINIC | Age: 20
End: 2025-08-27
Payer: COMMERCIAL

## 2025-08-27 VITALS
BODY MASS INDEX: 28.38 KG/M2 | HEART RATE: 72 BPM | HEIGHT: 64 IN | SYSTOLIC BLOOD PRESSURE: 108 MMHG | DIASTOLIC BLOOD PRESSURE: 62 MMHG | OXYGEN SATURATION: 97 % | WEIGHT: 166.2 LBS

## 2025-08-27 DIAGNOSIS — Z98.890 HISTORY OF LEFT HEART CATHETERIZATION: Primary | ICD-10-CM

## (undated) DEVICE — SOL IRR NACL 0.9PCT BT 1000ML

## (undated) DEVICE — PENCL SMOKE/EVAC MEGADYNE TELESCP 10FT

## (undated) DEVICE — PK C/SECT 10

## (undated) DEVICE — SUT PLAIN  3/0 CT1 27IN 842H

## (undated) DEVICE — SOL IRR H2O BTL 1000ML STRL

## (undated) DEVICE — SUT VIC 2/0 CT1 27IN J339H BX/36

## (undated) DEVICE — TRAP FLD MINIVAC MEGADYNE 100ML

## (undated) DEVICE — GLV SURG BIOGEL LTX PF 6

## (undated) DEVICE — PATIENT RETURN ELECTRODE, SINGLE-USE, CONTACT QUALITY MONITORING, ADULT, WITH 9FT CORD, FOR PATIENTS WEIGING OVER 33LBS. (15KG): Brand: MEGADYNE

## (undated) DEVICE — MAT PREVALON MOBL TRANSFR AIR W/PAD REPROC 39X81IN

## (undated) DEVICE — SUT MONOCRYL SZ 4 0 18IN PS1 Y682H BX/36

## (undated) DEVICE — SUT PDS 0 CTX 36IN DYED Z370T

## (undated) DEVICE — SUT MNCRYL 1/0 CT1 36IN Y947H BX/36

## (undated) DEVICE — TRY SPINE BLCK WHITACRE 25G 3X5IN

## (undated) DEVICE — BOWL UTIL STRL 32OZ

## (undated) DEVICE — 4-PORT MANIFOLD: Brand: NEPTUNE 2

## (undated) DEVICE — APPL CHLORAPREP TINTED 26ML TEAL

## (undated) DEVICE — CLTH CLENS READYCLEANSE PERI CARE PK/5

## (undated) DEVICE — ADHS SKIN PREMIERPRO EXOFIN TOPICAL HI/VISC .5ML

## (undated) DEVICE — COATED VICRYL  (POLYGLACTIN 910) SUTURE, VIOLET BRAIDED, STERILE, SYNTHETIC ABSORBABLE SUTURE: Brand: COATED VICRYL